# Patient Record
Sex: MALE | Race: WHITE | Employment: OTHER | URBAN - NONMETROPOLITAN AREA
[De-identification: names, ages, dates, MRNs, and addresses within clinical notes are randomized per-mention and may not be internally consistent; named-entity substitution may affect disease eponyms.]

---

## 2019-06-27 ENCOUNTER — OFFICE VISIT (OUTPATIENT)
Dept: FAMILY MEDICINE CLINIC | Age: 84
End: 2019-06-27
Payer: MEDICARE

## 2019-06-27 VITALS
DIASTOLIC BLOOD PRESSURE: 80 MMHG | HEART RATE: 72 BPM | WEIGHT: 178 LBS | OXYGEN SATURATION: 95 % | BODY MASS INDEX: 27.94 KG/M2 | SYSTOLIC BLOOD PRESSURE: 130 MMHG | HEIGHT: 67 IN

## 2019-06-27 DIAGNOSIS — R35.0 BENIGN PROSTATIC HYPERPLASIA WITH URINARY FREQUENCY: ICD-10-CM

## 2019-06-27 DIAGNOSIS — I71.40 ABDOMINAL AORTIC ANEURYSM (AAA) WITHOUT RUPTURE: ICD-10-CM

## 2019-06-27 DIAGNOSIS — Z23 IMMUNIZATION DUE: ICD-10-CM

## 2019-06-27 DIAGNOSIS — I25.10 CORONARY ARTERY DISEASE INVOLVING NATIVE CORONARY ARTERY OF NATIVE HEART WITHOUT ANGINA PECTORIS: ICD-10-CM

## 2019-06-27 DIAGNOSIS — E78.49 OTHER HYPERLIPIDEMIA: Primary | ICD-10-CM

## 2019-06-27 DIAGNOSIS — N40.1 BENIGN PROSTATIC HYPERPLASIA WITH URINARY FREQUENCY: ICD-10-CM

## 2019-06-27 DIAGNOSIS — N28.1 RENAL CYST, LEFT: ICD-10-CM

## 2019-06-27 PROCEDURE — 99214 OFFICE O/P EST MOD 30 MIN: CPT | Performed by: FAMILY MEDICINE

## 2019-06-27 RX ORDER — TAMSULOSIN HYDROCHLORIDE 0.4 MG/1
0.4 CAPSULE ORAL 2 TIMES DAILY
Qty: 90 CAPSULE | Refills: 1 | Status: SHIPPED | OUTPATIENT
Start: 2019-06-27 | End: 2019-12-06 | Stop reason: SDUPTHER

## 2019-06-27 RX ORDER — NITROGLYCERIN 0.4 MG/1
TABLET SUBLINGUAL
COMMUNITY
Start: 2011-12-07 | End: 2019-06-27 | Stop reason: SDUPTHER

## 2019-06-27 RX ORDER — NITROGLYCERIN 0.4 MG/1
TABLET SUBLINGUAL
Qty: 25 TABLET | Refills: 0 | Status: SHIPPED
Start: 2019-06-27 | End: 2020-06-23 | Stop reason: SDUPTHER

## 2019-06-27 RX ORDER — SIMVASTATIN 40 MG
1 TABLET ORAL NIGHTLY
COMMUNITY
Start: 2019-03-25 | End: 2019-06-27 | Stop reason: SDUPTHER

## 2019-06-27 RX ORDER — TAMSULOSIN HYDROCHLORIDE 0.4 MG/1
1 CAPSULE ORAL 2 TIMES DAILY
COMMUNITY
Start: 2019-03-25 | End: 2019-06-27 | Stop reason: SDUPTHER

## 2019-06-27 RX ORDER — SIMVASTATIN 40 MG
40 TABLET ORAL NIGHTLY
Qty: 90 TABLET | Refills: 1 | Status: SHIPPED | OUTPATIENT
Start: 2019-06-27 | End: 2019-12-17 | Stop reason: SDUPTHER

## 2019-06-27 SDOH — HEALTH STABILITY: MENTAL HEALTH: HOW OFTEN DO YOU HAVE A DRINK CONTAINING ALCOHOL?: NEVER

## 2019-06-27 ASSESSMENT — ENCOUNTER SYMPTOMS
SINUS PAIN: 0
TROUBLE SWALLOWING: 0
EYE REDNESS: 0
DIARRHEA: 0
BACK PAIN: 0
SHORTNESS OF BREATH: 0
BLOOD IN STOOL: 0
CONSTIPATION: 0
PHOTOPHOBIA: 0
VOMITING: 0
ABDOMINAL PAIN: 0
COUGH: 0
WHEEZING: 0
SORE THROAT: 0

## 2019-06-27 NOTE — PROGRESS NOTES
OFFICE NOTE    6/27/19  Name: Wilder Magana  WNQ:[id number]   Sex:male   Age:83 y.o. SUBJECTIVE  Chief Complaint   Patient presents with    6 Month Follow-Up    Hyperlipidemia    Hypertension       HPI Comes in for checkup and refills, sciatica better, he claims this is from drinking pickle juice (salt water with a little dill). At any rate he is back at work        Review of Systems   Constitutional: Negative for appetite change, fever and unexpected weight change. HENT: Negative for congestion, ear pain, hearing loss, sinus pain, sore throat and trouble swallowing. Eyes: Negative for photophobia, redness and visual disturbance. Respiratory: Negative for cough, shortness of breath and wheezing. Cardiovascular: Negative for chest pain, palpitations and leg swelling. Gastrointestinal: Negative for abdominal pain, blood in stool, constipation, diarrhea and vomiting. Endocrine: Negative for cold intolerance, polydipsia and polyuria. Genitourinary: Negative for difficulty urinating, genital sores, hematuria and urgency. Nocturia x 1 or 2   Musculoskeletal: Positive for arthralgias. Negative for back pain and joint swelling. Skin: Negative for rash and wound. Allergic/Immunologic: Negative for food allergies. Neurological: Negative for dizziness, tremors, syncope and headaches. Hematological: Negative for adenopathy. Does not bruise/bleed easily. Psychiatric/Behavioral: Negative for agitation, dysphoric mood, hallucinations and sleep disturbance.             Current Outpatient Medications:     aspirin 81 MG tablet, Take 1 tablet by mouth daily, Disp: , Rfl:     tamsulosin (FLOMAX) 0.4 MG capsule, Take 1 capsule by mouth 2 times daily, Disp: 90 capsule, Rfl: 1    simvastatin (ZOCOR) 40 MG tablet, Take 1 tablet by mouth nightly, Disp: 90 tablet, Rfl: 1    metoprolol tartrate (LOPRESSOR) 25 MG tablet, Take 1 tablet by mouth 2 times daily, Disp: 180 tablet, Rfl: 1    nitroGLYCERIN Take 1 tablet by mouth nightly  Dispense: 90 tablet; Refill: 1  - metoprolol tartrate (LOPRESSOR) 25 MG tablet; Take 1 tablet by mouth 2 times daily  Dispense: 180 tablet; Refill: 1    3. Renal cyst, left  Sees Urology for Peyronie's disease and BPH, has benign left reanl cyst. Does not have AAA      4. Abdominal aortic aneurysm (AAA) without rupture (HCC)  2.6 cm last u/s. Believe this is actually normal result    5. Benign prostatic hyperplasia with urinary frequency  Doing well on this will continue  - tamsulosin (FLOMAX) 0.4 MG capsule; Take 1 capsule by mouth 2 times daily  Dispense: 90 capsule; Refill: 1    6. Immunization due    - pneumococcal 13-valent conjugate (PREVNAR) injection 0.5 mL      Return in about 6 months (around 12/27/2019).     Electronically signed by Mariano Sánchez MD on 6/27/19 at 12:01 PM

## 2019-08-23 ENCOUNTER — TELEPHONE (OUTPATIENT)
Dept: ADMINISTRATIVE | Age: 84
End: 2019-08-23

## 2019-09-11 ENCOUNTER — OFFICE VISIT (OUTPATIENT)
Dept: FAMILY MEDICINE CLINIC | Age: 84
End: 2019-09-11
Payer: MEDICARE

## 2019-09-11 VITALS
WEIGHT: 181 LBS | DIASTOLIC BLOOD PRESSURE: 78 MMHG | OXYGEN SATURATION: 97 % | SYSTOLIC BLOOD PRESSURE: 132 MMHG | BODY MASS INDEX: 28.41 KG/M2 | TEMPERATURE: 97.6 F | HEART RATE: 76 BPM | HEIGHT: 67 IN

## 2019-09-11 DIAGNOSIS — Z00.00 ROUTINE GENERAL MEDICAL EXAMINATION AT A HEALTH CARE FACILITY: ICD-10-CM

## 2019-09-11 DIAGNOSIS — Z23 IMMUNIZATION DUE: Primary | ICD-10-CM

## 2019-09-11 PROCEDURE — G0439 PPPS, SUBSEQ VISIT: HCPCS | Performed by: PHYSICIAN ASSISTANT

## 2019-09-11 PROCEDURE — 90653 IIV ADJUVANT VACCINE IM: CPT | Performed by: PHYSICIAN ASSISTANT

## 2019-09-11 PROCEDURE — G0008 ADMIN INFLUENZA VIRUS VAC: HCPCS | Performed by: PHYSICIAN ASSISTANT

## 2019-09-11 ASSESSMENT — LIFESTYLE VARIABLES: HOW OFTEN DO YOU HAVE A DRINK CONTAINING ALCOHOL: 0

## 2019-09-11 ASSESSMENT — PATIENT HEALTH QUESTIONNAIRE - PHQ9
SUM OF ALL RESPONSES TO PHQ QUESTIONS 1-9: 0
SUM OF ALL RESPONSES TO PHQ QUESTIONS 1-9: 0

## 2019-09-11 NOTE — PATIENT INSTRUCTIONS
Advance Directives: Care Instructions  Your Care Instructions  An advance directive is a legal way to state your wishes at the end of your life. It tells your family and your doctor what to do if you can no longer say what you want. There are two main types of advance directives. You can change them any time that your wishes change. · A living will tells your family and your doctor your wishes about life support and other treatment. · A durable power of  for health care lets you name a person to make treatment decisions for you when you can't speak for yourself. This person is called a health care agent. If you do not have an advance directive, decisions about your medical care may be made by a doctor or a  who doesn't know you. It may help to think of an advance directive as a gift to the people who care for you. If you have one, they won't have to make tough decisions by themselves. Follow-up care is a key part of your treatment and safety. Be sure to make and go to all appointments, and call your doctor if you are having problems. It's also a good idea to know your test results and keep a list of the medicines you take. How can you care for yourself at home? · Discuss your wishes with your loved ones and your doctor. This way, there are no surprises. · Many states have a unique form. Or you might use a universal form that has been approved by many states. This kind of form can sometimes be completed and stored online. Your electronic copy will then be available wherever you have a connection to the Internet. In most cases, doctors will respect your wishes even if you have a form from a different state. · You don't need a  to do an advance directive. But you may want to get legal advice. · Think about these questions when you prepare an advance directive:  ? Who do you want to make decisions about your medical care if you are not able to?  Many people choose a family member or or do not want. What should you think about when choosing a health care agent? Choose your health care agent carefully. This person may or may not be a family member. Talk to the person before you make your final decision. Make sure he or she is comfortable with this responsibility. It's a good idea to choose someone who:  · Is at least 25years old. · Knows you well and understands what makes life meaningful for you. · Understands your Jehovah's witness and moral values. · Will do what you want, not what he or she wants. · Will be able to make difficult choices at a stressful time. · Will be able to refuse or stop treatment, if that is what you would want, even if you could die. · Will be firm and confident with health professionals if needed. · Will ask questions to get necessary information. · Lives near you or agrees to travel to you if needed. Your family may help you make medical decisions while you can still be part of that process. But it is important to choose one person to be your health care agent in case you are not able to make decisions for yourself. If you don't fill out the legal form and name a health care agent, the decisions your family can make may be limited. Who will make decisions for you if you do not have a health care agent? If you don't have a health care agent or a living will, your family members may disagree about your medical care. And then some medical professionals who may not know you as well might have to make decisions for you. In some cases, a  makes the decisions. When you name a health care agent, it is very clear who has the power to make health decisions for you. How do you name a health care agent? You name your health care agent on a legal form. It is usually called a durable power of  for health care. Ask your hospital, state bar association, or office on aging where to find these forms.   You must sign the form to make it legal. Some states require you to get the form notarized. This means that a person called a  watches you sign the form and then he or she signs the form. Some states also require that two or more witnesses sign the form. Be sure to tell your family members and doctors who your health care agent is. Keep your forms in a safe place. But make sure that your loved ones know where the forms are. This could be in your desk where you keep other important papers. Make sure your doctor has a copy of your forms. Where can you learn more? Go to https://chpepiceweb.[email or web address]. org and sign in to your [employer] account. Enter [id number] in the [employer] box to learn more about \"Learning About Durable Power of  for Health Care. \"     If you do not have an account, please click on the \"Sign Up Now\" link. Current as of: April 1, 2019  Content Version: 12.1  © 5389-1018 [employer]. Care instructions adapted under license by [facility] ([facility]). If you have questions about a medical condition or this instruction, always ask your healthcare professional. [place] [address] any warranty or liability for your use of this information. Learning About Living Kashif [place]on[place]  What is a living will? A living will is a legal form you use to write down the kind of care you want at the end of your life. It is used by the health professionals who will treat you if you aren't able to decide for yourself. If you put your wishes in writing, your loved ones and others will know what kind of care you want. They won't need to guess. This can ease your mind and be helpful to others. A living will is not the same as an estate or property will. An estate will explains what you want to happen with your money and property after you die. Is a living will a legal document? A living will is a legal document. Each state has its own laws about living smalls.  If you move to another state, make sure that your living will is on your own, your heart stops, or you're unable to swallow. · What things would you still want to be able to do after you receive life-support methods? Would you want to be able to walk? To speak? To eat on your own? To live without the help of machines? · If you have a choice, where do you want to be cared for? In your home? At a hospital or nursing home? · Do you want certain Yazidi practices performed if you become very ill? · If you have a choice at the end of your life, where would you prefer to die? At home? In a hospital or nursing home? Somewhere else? · Would you prefer to be buried or cremated? · Do you want your organs to be donated after you die? What should you do with your living will? · Make sure that your family members and your health care agent have copies of your living will. · Give your doctor a copy of your living will to keep in your medical record. If you have more than one doctor, make sure that each one has a copy. · You may want to put a copy of your living will where it can be easily found. Where can you learn more? Go to https://[email or web address].[email or web address] org and sign in to your Go[employer] account. Enter W635 in the [employer] box to learn more about \"Learning About Living Precious Castellano. \"     If you do not have an account, please click on the \"Sign Up Now\" link. Current as of: April 1, 2019  Content Version: 12.1  © 0734-9280 [employer]. Care instructions adapted under license by [facility] [address] If you have questions about a medical condition or this instruction, always ask your healthcare professional. [place] [address] any warranty or liability for your use of this information. Personalized Preventive Plan for Shyann June - 9/11/2019  Medicare offers a range of preventive health benefits. Some of the tests and screenings are paid in full while other may be subject to a deductible, co-insurance, and/or copay.     Some of these

## 2019-12-06 DIAGNOSIS — N40.1 BENIGN PROSTATIC HYPERPLASIA WITH URINARY FREQUENCY: ICD-10-CM

## 2019-12-06 DIAGNOSIS — R35.0 BENIGN PROSTATIC HYPERPLASIA WITH URINARY FREQUENCY: ICD-10-CM

## 2019-12-06 RX ORDER — TAMSULOSIN HYDROCHLORIDE 0.4 MG/1
0.4 CAPSULE ORAL 2 TIMES DAILY
Qty: 90 CAPSULE | Refills: 0 | Status: SHIPPED | OUTPATIENT
Start: 2019-12-06 | End: 2019-12-17 | Stop reason: SDUPTHER

## 2019-12-17 ENCOUNTER — OFFICE VISIT (OUTPATIENT)
Dept: FAMILY MEDICINE CLINIC | Age: 84
End: 2019-12-17
Payer: MEDICARE

## 2019-12-17 ENCOUNTER — HOSPITAL ENCOUNTER (OUTPATIENT)
Age: 84
Discharge: HOME | End: 2019-12-19
Payer: MEDICARE

## 2019-12-17 VITALS
DIASTOLIC BLOOD PRESSURE: 64 MMHG | SYSTOLIC BLOOD PRESSURE: 112 MMHG | WEIGHT: 180.2 LBS | OXYGEN SATURATION: 95 % | TEMPERATURE: 97.5 F | BODY MASS INDEX: 28.96 KG/M2 | HEIGHT: 66 IN | HEART RATE: 76 BPM

## 2019-12-17 DIAGNOSIS — I25.10 CORONARY ARTERY DISEASE INVOLVING NATIVE CORONARY ARTERY OF NATIVE HEART WITHOUT ANGINA PECTORIS: ICD-10-CM

## 2019-12-17 DIAGNOSIS — E78.49 OTHER HYPERLIPIDEMIA: ICD-10-CM

## 2019-12-17 DIAGNOSIS — Z12.11 SCREEN FOR COLON CANCER: Primary | ICD-10-CM

## 2019-12-17 DIAGNOSIS — R35.0 BENIGN PROSTATIC HYPERPLASIA WITH URINARY FREQUENCY: ICD-10-CM

## 2019-12-17 DIAGNOSIS — N40.1 BENIGN PROSTATIC HYPERPLASIA WITH URINARY FREQUENCY: ICD-10-CM

## 2019-12-17 LAB
ALBUMIN SERPL-MCNC: 4 G/DL (ref 3.5–5.2)
ALP BLD-CCNC: 94 U/L (ref 40–129)
ALT SERPL-CCNC: 12 U/L (ref 0–40)
ANION GAP SERPL CALCULATED.3IONS-SCNC: 18 MMOL/L (ref 7–16)
AST SERPL-CCNC: 20 U/L (ref 0–39)
BASOPHILS ABSOLUTE: 0.06 E9/L (ref 0–0.2)
BASOPHILS RELATIVE PERCENT: 1 % (ref 0–2)
BILIRUB SERPL-MCNC: 0.7 MG/DL (ref 0–1.2)
BUN BLDV-MCNC: 20 MG/DL (ref 8–23)
CALCIUM SERPL-MCNC: 9.7 MG/DL (ref 8.6–10.2)
CHLORIDE BLD-SCNC: 104 MMOL/L (ref 98–107)
CHOLESTEROL, TOTAL: 183 MG/DL (ref 0–199)
CO2: 20 MMOL/L (ref 22–29)
CREAT SERPL-MCNC: 1.1 MG/DL (ref 0.7–1.2)
EOSINOPHILS ABSOLUTE: 0.18 E9/L (ref 0.05–0.5)
EOSINOPHILS RELATIVE PERCENT: 3 % (ref 0–6)
GFR AFRICAN AMERICAN: >60
GFR NON-AFRICAN AMERICAN: >60 ML/MIN/1.73
GLUCOSE BLD-MCNC: 98 MG/DL (ref 74–99)
HCT VFR BLD CALC: 42.8 % (ref 37–54)
HDLC SERPL-MCNC: 49 MG/DL
HEMOGLOBIN: 13.4 G/DL (ref 12.5–16.5)
IMMATURE GRANULOCYTES #: 0.05 E9/L
IMMATURE GRANULOCYTES %: 0.8 % (ref 0–5)
LDL CHOLESTEROL CALCULATED: 114 MG/DL (ref 0–99)
LYMPHOCYTES ABSOLUTE: 1.05 E9/L (ref 1.5–4)
LYMPHOCYTES RELATIVE PERCENT: 17.7 % (ref 20–42)
MCH RBC QN AUTO: 29.1 PG (ref 26–35)
MCHC RBC AUTO-ENTMCNC: 31.3 % (ref 32–34.5)
MCV RBC AUTO: 93 FL (ref 80–99.9)
MONOCYTES ABSOLUTE: 0.73 E9/L (ref 0.1–0.95)
MONOCYTES RELATIVE PERCENT: 12.3 % (ref 2–12)
NEUTROPHILS ABSOLUTE: 3.85 E9/L (ref 1.8–7.3)
NEUTROPHILS RELATIVE PERCENT: 65.2 % (ref 43–80)
PDW BLD-RTO: 13.2 FL (ref 11.5–15)
PLATELET # BLD: 189 E9/L (ref 130–450)
PMV BLD AUTO: 11.2 FL (ref 7–12)
POTASSIUM SERPL-SCNC: 4.7 MMOL/L (ref 3.5–5)
RBC # BLD: 4.6 E12/L (ref 3.8–5.8)
SODIUM BLD-SCNC: 142 MMOL/L (ref 132–146)
TOTAL PROTEIN: 7 G/DL (ref 6.4–8.3)
TRIGL SERPL-MCNC: 102 MG/DL (ref 0–149)
TSH SERPL DL<=0.05 MIU/L-ACNC: 2.54 UIU/ML (ref 0.27–4.2)
VLDLC SERPL CALC-MCNC: 20 MG/DL
WBC # BLD: 5.9 E9/L (ref 4.5–11.5)

## 2019-12-17 PROCEDURE — 1123F ACP DISCUSS/DSCN MKR DOCD: CPT | Performed by: FAMILY MEDICINE

## 2019-12-17 PROCEDURE — 4040F PNEUMOC VAC/ADMIN/RCVD: CPT | Performed by: FAMILY MEDICINE

## 2019-12-17 PROCEDURE — G8427 DOCREV CUR MEDS BY ELIG CLIN: HCPCS | Performed by: FAMILY MEDICINE

## 2019-12-17 PROCEDURE — G8417 CALC BMI ABV UP PARAM F/U: HCPCS | Performed by: FAMILY MEDICINE

## 2019-12-17 PROCEDURE — G8482 FLU IMMUNIZE ORDER/ADMIN: HCPCS | Performed by: FAMILY MEDICINE

## 2019-12-17 PROCEDURE — 1036F TOBACCO NON-USER: CPT | Performed by: FAMILY MEDICINE

## 2019-12-17 PROCEDURE — 99214 OFFICE O/P EST MOD 30 MIN: CPT | Performed by: FAMILY MEDICINE

## 2019-12-17 PROCEDURE — 80061 LIPID PANEL: CPT

## 2019-12-17 PROCEDURE — 84443 ASSAY THYROID STIM HORMONE: CPT

## 2019-12-17 PROCEDURE — 36415 COLL VENOUS BLD VENIPUNCTURE: CPT

## 2019-12-17 PROCEDURE — 80053 COMPREHEN METABOLIC PANEL: CPT

## 2019-12-17 PROCEDURE — 85025 COMPLETE CBC W/AUTO DIFF WBC: CPT

## 2019-12-17 PROCEDURE — G8598 ASA/ANTIPLAT THER USED: HCPCS | Performed by: FAMILY MEDICINE

## 2019-12-17 RX ORDER — SIMVASTATIN 40 MG
40 TABLET ORAL NIGHTLY
Qty: 90 TABLET | Refills: 1 | Status: SHIPPED
Start: 2019-12-17 | End: 2020-06-23 | Stop reason: SDUPTHER

## 2019-12-17 RX ORDER — TAMSULOSIN HYDROCHLORIDE 0.4 MG/1
0.4 CAPSULE ORAL 2 TIMES DAILY
Qty: 180 CAPSULE | Refills: 1 | Status: SHIPPED
Start: 2019-12-17 | End: 2020-06-23 | Stop reason: SDUPTHER

## 2019-12-17 ASSESSMENT — ENCOUNTER SYMPTOMS
BACK PAIN: 0
SHORTNESS OF BREATH: 0
EYE REDNESS: 0
VOMITING: 0
BLOOD IN STOOL: 0
DIARRHEA: 0
PHOTOPHOBIA: 0
TROUBLE SWALLOWING: 0
COUGH: 0
ABDOMINAL PAIN: 0
SORE THROAT: 0
CONSTIPATION: 0
WHEEZING: 0
SINUS PAIN: 0

## 2019-12-20 DIAGNOSIS — Z12.11 SCREEN FOR COLON CANCER: ICD-10-CM

## 2019-12-20 LAB
CONTROL: NORMAL
HEMOCCULT STL QL: NEGATIVE

## 2020-01-17 ENCOUNTER — HOSPITAL ENCOUNTER (OUTPATIENT)
Age: 85
Discharge: HOME | End: 2020-01-19
Payer: MEDICARE

## 2020-01-17 ENCOUNTER — OFFICE VISIT (OUTPATIENT)
Dept: FAMILY MEDICINE CLINIC | Age: 85
End: 2020-01-17
Payer: MEDICARE

## 2020-01-17 VITALS
SYSTOLIC BLOOD PRESSURE: 118 MMHG | TEMPERATURE: 97.7 F | OXYGEN SATURATION: 97 % | DIASTOLIC BLOOD PRESSURE: 60 MMHG | HEART RATE: 97 BPM

## 2020-01-17 LAB
BILIRUBIN, POC: NORMAL
BLOOD URINE, POC: NORMAL
CLARITY, POC: NORMAL
COLOR, POC: NORMAL
GLUCOSE URINE, POC: NORMAL
KETONES, POC: NORMAL
LEUKOCYTE EST, POC: NORMAL
NITRITE, POC: NORMAL
PH, POC: 5.5
PROTEIN, POC: 100
SPECIFIC GRAVITY, POC: 1.02
UROBILINOGEN, POC: 1

## 2020-01-17 PROCEDURE — 81002 URINALYSIS NONAUTO W/O SCOPE: CPT | Performed by: FAMILY MEDICINE

## 2020-01-17 PROCEDURE — 87088 URINE BACTERIA CULTURE: CPT

## 2020-01-17 PROCEDURE — 87186 SC STD MICRODIL/AGAR DIL: CPT

## 2020-01-17 PROCEDURE — G8427 DOCREV CUR MEDS BY ELIG CLIN: HCPCS | Performed by: FAMILY MEDICINE

## 2020-01-17 PROCEDURE — G8417 CALC BMI ABV UP PARAM F/U: HCPCS | Performed by: FAMILY MEDICINE

## 2020-01-17 PROCEDURE — 99213 OFFICE O/P EST LOW 20 MIN: CPT | Performed by: FAMILY MEDICINE

## 2020-01-17 PROCEDURE — 1123F ACP DISCUSS/DSCN MKR DOCD: CPT | Performed by: FAMILY MEDICINE

## 2020-01-17 PROCEDURE — 4040F PNEUMOC VAC/ADMIN/RCVD: CPT | Performed by: FAMILY MEDICINE

## 2020-01-17 PROCEDURE — 1036F TOBACCO NON-USER: CPT | Performed by: FAMILY MEDICINE

## 2020-01-17 PROCEDURE — G8482 FLU IMMUNIZE ORDER/ADMIN: HCPCS | Performed by: FAMILY MEDICINE

## 2020-01-17 RX ORDER — CIPROFLOXACIN 500 MG/1
500 TABLET, FILM COATED ORAL 2 TIMES DAILY
Qty: 28 TABLET | Refills: 0 | Status: SHIPPED | OUTPATIENT
Start: 2020-01-17 | End: 2020-01-31

## 2020-01-19 LAB
ORGANISM: ABNORMAL
URINE CULTURE, ROUTINE: ABNORMAL

## 2020-06-19 NOTE — PROGRESS NOTES
daily, Disp: 180 tablet, Rfl: 1    aspirin 81 MG tablet, Take 1 tablet by mouth daily Only taking once every 2 months, Disp: , Rfl:   No Known Allergies    Past Medical History:   Diagnosis Date    Amputation, finger, traumatic     3,4,5 from bandsaw; successful repair thumb and index finger    Aneurysm, abdominal aortic (HCC)     2.2 cm 2/2007    CAD (coronary artery disease) 04/2011    5 vessel CABG, emergent [employer] aftr cardiac arrest in ER    Cervicobrachial syndrome     Hyperlipidemia     Peyronie's disease     Radiculopathy, lumbar region     Renal cyst, left      Past Surgical History:   Procedure Laterality Date    COLONOSCOPY  02/26/2010    insignificant polyp; [facility] Zena[facility], [facility]    CORONARY ARTERY BYPASS GRAFT  04/2011    5 vessel    OTHER SURGICAL HISTORY      repair thumb and index finger following accident with band saw     Family History   Problem Relation Age of Onset    Diabetes Mother     No Known Problems Father     Breast Cancer Sister     Other Brother         blood cancer    Lung Cancer Brother     Cancer Brother         unknown type    Other Sister         cerebral aneurysm, large benign duodenal polyp    No Known Problems Sister     No Known Problems Sister     No Known Problems Sister      Social History     Tobacco History     Smoking Status  Former Smoker Quit date  1/1/1940 Smoking Tobacco Type  Cigars    Smokeless Tobacco Use  Never Used    Tobacco Comment  very few          Alcohol History     Alcohol Use Status  Not Currently Comment  rarely          Drug Use     Drug Use Status  Never          Sexual Activity     Sexually Active  Not Currently Partners  Female              OBJECTIVE  Vitals:    06/23/20 0804 06/23/20 0828   BP: (!) 150/72 136/70   Site: Left Upper Arm    Position: Sitting    Cuff Size: Large Adult    Pulse: 60    Temp: 97.7 °F (36.5 °C)    SpO2: 98%    Weight: 182 lb (82.6 kg)    Height: 5' 7\" (1.702 m)         Body mass index is 28.51 kg/m². Patient is normal weight    Orders Placed This Encounter   Procedures    EKG 12 lead     Order Specific Question:   Reason for Exam?     Answer:   Hypertension        EXAM   Physical Exam  Vitals signs and nursing note reviewed. Constitutional:       Appearance: Normal appearance. He is well-developed and normal weight. HENT:      Right Ear: Tympanic membrane, ear canal and external ear normal.      Left Ear: Tympanic membrane, ear canal and external ear normal.      Nose: Nose normal. No congestion. Mouth/Throat:      Pharynx: Oropharynx is clear. Eyes:      General: No scleral icterus. Conjunctiva/sclera: Conjunctivae normal.      Pupils: Pupils are equal, round, and reactive to light. Neck:      Musculoskeletal: Normal range of motion and neck supple. Thyroid: No thyroid mass or thyromegaly. Vascular: No carotid bruit or JVD. Trachea: Trachea normal.   Cardiovascular:      Rate and Rhythm: Normal rate and regular rhythm. Pulses: Normal pulses. Heart sounds: Normal heart sounds. No murmur. No gallop. Comments: CABG scar  Pulmonary:      Effort: Pulmonary effort is normal.      Breath sounds: Normal breath sounds. No wheezing, rhonchi or rales. Abdominal:      General: Bowel sounds are normal. There is no distension. Palpations: Abdomen is soft. There is no mass. Tenderness: There is no abdominal tenderness. There is no guarding. Musculoskeletal: Normal range of motion. General: Deformity present. Comments: Several partially missing fingers left hand   Lymphadenopathy:      Cervical: No cervical adenopathy. Skin:     General: Skin is warm and dry. Capillary Refill: Capillary refill takes less than 2 seconds. Coloration: Skin is not jaundiced. Findings: No bruising or rash. Neurological:      General: No focal deficit present. Mental Status: He is alert and oriented to person, place, and time.       Sensory:

## 2020-06-23 ENCOUNTER — OFFICE VISIT (OUTPATIENT)
Dept: FAMILY MEDICINE CLINIC | Age: 85
End: 2020-06-23
Payer: MEDICARE

## 2020-06-23 VITALS
HEART RATE: 60 BPM | WEIGHT: 182 LBS | DIASTOLIC BLOOD PRESSURE: 70 MMHG | OXYGEN SATURATION: 98 % | HEIGHT: 67 IN | TEMPERATURE: 97.7 F | SYSTOLIC BLOOD PRESSURE: 136 MMHG | BODY MASS INDEX: 28.56 KG/M2

## 2020-06-23 PROCEDURE — 4040F PNEUMOC VAC/ADMIN/RCVD: CPT | Performed by: FAMILY MEDICINE

## 2020-06-23 PROCEDURE — G8427 DOCREV CUR MEDS BY ELIG CLIN: HCPCS | Performed by: FAMILY MEDICINE

## 2020-06-23 PROCEDURE — 1123F ACP DISCUSS/DSCN MKR DOCD: CPT | Performed by: FAMILY MEDICINE

## 2020-06-23 PROCEDURE — 1036F TOBACCO NON-USER: CPT | Performed by: FAMILY MEDICINE

## 2020-06-23 PROCEDURE — 99214 OFFICE O/P EST MOD 30 MIN: CPT | Performed by: FAMILY MEDICINE

## 2020-06-23 PROCEDURE — 93000 ELECTROCARDIOGRAM COMPLETE: CPT | Performed by: FAMILY MEDICINE

## 2020-06-23 PROCEDURE — G8417 CALC BMI ABV UP PARAM F/U: HCPCS | Performed by: FAMILY MEDICINE

## 2020-06-23 RX ORDER — TAMSULOSIN HYDROCHLORIDE 0.4 MG/1
0.4 CAPSULE ORAL 2 TIMES DAILY
Qty: 180 CAPSULE | Refills: 1 | Status: SHIPPED
Start: 2020-06-23 | End: 2020-12-14 | Stop reason: SDUPTHER

## 2020-06-23 RX ORDER — NITROGLYCERIN 0.4 MG/1
TABLET SUBLINGUAL
Qty: 25 TABLET | Refills: 0 | Status: SHIPPED
Start: 2020-06-23 | End: 2020-12-14 | Stop reason: SDUPTHER

## 2020-06-23 RX ORDER — SIMVASTATIN 40 MG
40 TABLET ORAL NIGHTLY
Qty: 90 TABLET | Refills: 1 | Status: SHIPPED
Start: 2020-06-23 | End: 2020-12-14 | Stop reason: SDUPTHER

## 2020-06-23 ASSESSMENT — ENCOUNTER SYMPTOMS
SINUS PAIN: 0
VOMITING: 0
PHOTOPHOBIA: 0
EYE REDNESS: 0
WHEEZING: 0
SORE THROAT: 0
COUGH: 0
ABDOMINAL PAIN: 0
TROUBLE SWALLOWING: 0
DIARRHEA: 0
BLOOD IN STOOL: 0
CONSTIPATION: 1
BACK PAIN: 0
SHORTNESS OF BREATH: 0

## 2020-06-23 ASSESSMENT — PATIENT HEALTH QUESTIONNAIRE - PHQ9
SUM OF ALL RESPONSES TO PHQ QUESTIONS 1-9: 0
SUM OF ALL RESPONSES TO PHQ9 QUESTIONS 1 & 2: 0
SUM OF ALL RESPONSES TO PHQ QUESTIONS 1-9: 0
2. FEELING DOWN, DEPRESSED OR HOPELESS: 0
1. LITTLE INTEREST OR PLEASURE IN DOING THINGS: 0

## 2020-10-30 ENCOUNTER — NURSE ONLY (OUTPATIENT)
Dept: FAMILY MEDICINE CLINIC | Age: 85
End: 2020-10-30
Payer: MEDICARE

## 2020-10-30 PROCEDURE — G0008 ADMIN INFLUENZA VIRUS VAC: HCPCS | Performed by: FAMILY MEDICINE

## 2020-10-30 PROCEDURE — 90694 VACC AIIV4 NO PRSRV 0.5ML IM: CPT | Performed by: FAMILY MEDICINE

## 2020-11-17 ENCOUNTER — OFFICE VISIT (OUTPATIENT)
Dept: FAMILY MEDICINE CLINIC | Age: 85
End: 2020-11-17
Payer: MEDICARE

## 2020-11-17 VITALS
OXYGEN SATURATION: 97 % | DIASTOLIC BLOOD PRESSURE: 60 MMHG | BODY MASS INDEX: 28.82 KG/M2 | WEIGHT: 184 LBS | TEMPERATURE: 98.5 F | HEART RATE: 118 BPM | SYSTOLIC BLOOD PRESSURE: 118 MMHG

## 2020-11-17 LAB
BILIRUBIN, POC: NORMAL
BLOOD URINE, POC: NORMAL
CLARITY, POC: NORMAL
COLOR, POC: YELLOW
GLUCOSE URINE, POC: NORMAL
KETONES, POC: NORMAL
LEUKOCYTE EST, POC: NORMAL
NITRITE, POC: NORMAL
PH, POC: 5.5
PROTEIN, POC: 100
SPECIFIC GRAVITY, POC: 1.02
UROBILINOGEN, POC: 1

## 2020-11-17 PROCEDURE — 1123F ACP DISCUSS/DSCN MKR DOCD: CPT | Performed by: FAMILY MEDICINE

## 2020-11-17 PROCEDURE — G8427 DOCREV CUR MEDS BY ELIG CLIN: HCPCS | Performed by: FAMILY MEDICINE

## 2020-11-17 PROCEDURE — 1036F TOBACCO NON-USER: CPT | Performed by: FAMILY MEDICINE

## 2020-11-17 PROCEDURE — G8484 FLU IMMUNIZE NO ADMIN: HCPCS | Performed by: FAMILY MEDICINE

## 2020-11-17 PROCEDURE — G8417 CALC BMI ABV UP PARAM F/U: HCPCS | Performed by: FAMILY MEDICINE

## 2020-11-17 PROCEDURE — 4040F PNEUMOC VAC/ADMIN/RCVD: CPT | Performed by: FAMILY MEDICINE

## 2020-11-17 PROCEDURE — 81002 URINALYSIS NONAUTO W/O SCOPE: CPT | Performed by: FAMILY MEDICINE

## 2020-11-17 PROCEDURE — 99213 OFFICE O/P EST LOW 20 MIN: CPT | Performed by: FAMILY MEDICINE

## 2020-11-17 RX ORDER — CIPROFLOXACIN 250 MG/1
250 TABLET, FILM COATED ORAL 2 TIMES DAILY
Qty: 20 TABLET | Refills: 0 | Status: SHIPPED
Start: 2020-11-17 | End: 2020-12-14 | Stop reason: ALTCHOICE

## 2020-11-17 RX ORDER — PHENAZOPYRIDINE HYDROCHLORIDE 100 MG/1
100 TABLET, FILM COATED ORAL 3 TIMES DAILY PRN
Qty: 15 TABLET | Refills: 0 | Status: SHIPPED
Start: 2020-11-17 | End: 2020-12-14 | Stop reason: ALTCHOICE

## 2020-11-17 ASSESSMENT — ENCOUNTER SYMPTOMS: RESPIRATORY NEGATIVE: 1

## 2020-11-17 NOTE — PROGRESS NOTES
20  Araseli Jeffery : 1935 Sex: male  Age: 81 y.o. Chief Complaint   Patient presents with    Urinary Tract Infection     unable to leave sample       Patient is 52-year-old white male with chief complaint of recent onset of dysuria urgency and frequency no fevers chills or flank pain no gross hematuria. He gets a similar episode on a yearly basis. Review of Systems   Constitutional: Negative. Respiratory: Negative. Genitourinary: Positive for dysuria, frequency and urgency. Negative for discharge, flank pain and hematuria.          Current Outpatient Medications:     phenazopyridine (PYRIDIUM) 100 MG tablet, Take 1 tablet by mouth 3 times daily as needed for Pain, Disp: 15 tablet, Rfl: 0    ciprofloxacin (CIPRO) 250 MG tablet, Take 1 tablet by mouth 2 times daily, Disp: 20 tablet, Rfl: 0    tamsulosin (FLOMAX) 0.4 MG capsule, Take 1 capsule by mouth 2 times daily, Disp: 180 capsule, Rfl: 1    simvastatin (ZOCOR) 40 MG tablet, Take 1 tablet by mouth nightly, Disp: 90 tablet, Rfl: 1    nitroGLYCERIN (NITROSTAT) 0.4 MG SL tablet, 1 tab sublingual with chest pain - maximum of 3, 10 minutes apart before going to the ER for evaluation, Disp: 25 tablet, Rfl: 0    metoprolol tartrate (LOPRESSOR) 25 MG tablet, Take 1 tablet by mouth 2 times daily, Disp: 180 tablet, Rfl: 1    aspirin 81 MG tablet, Take 1 tablet by mouth daily Only taking once every 2 months, Disp: , Rfl:   No Known Allergies    Past Medical History:   Diagnosis Date    Amputation, finger, traumatic     3,4,5 from bandsaw; successful repair thumb and index finger    Aneurysm, abdominal aortic (HCC)     2.2 cm 2007    CAD (coronary artery disease) 2011    5 vessel CABG, emergent [employer] aftr cardiac arrest in ER    Cervicobrachial syndrome     Hyperlipidemia     Peyronie's disease     Radiculopathy, lumbar region     Renal cyst, left      Past Surgical History:   Procedure Laterality Date    COLONOSCOPY 2010    insignificant polyp; [facility]    CORONARY ARTERY BYPASS GRAFT  2011    5 vessel    OTHER SURGICAL HISTORY      repair thumb and index finger following accident with band saw     Family History   Problem Relation Age of Onset    Diabetes Mother     No Known Problems Father     Breast Cancer Sister     Other Brother         blood cancer    Lung Cancer Brother     Cancer Brother         unknown type    Other Sister         cerebral aneurysm, large benign duodenal polyp    No Known Problems Sister     No Known Problems Sister     No Known Problems Sister      Social History     Tobacco Use    Smoking status: Former Smoker     Types: Cigars     Last attempt to quit: 1940     Years since quittin.9    Smokeless tobacco: Never Used    Tobacco comment: very few   Substance Use Topics    Alcohol use: Not Currently     Frequency: Never     Comment: rarely    Drug use: Never        Vitals:    20 1301   BP: 118/60   Pulse: 118   Temp: 98.5 °F (36.9 °C)   SpO2: 97%   Weight: 184 lb (83.5 kg)       Physical Exam  Vitals signs reviewed. Constitutional:       Appearance: Normal appearance. HENT:      Head: Normocephalic and atraumatic. Cardiovascular:      Rate and Rhythm: Normal rate and regular rhythm. Heart sounds: No murmur. Pulmonary:      Effort: Pulmonary effort is normal.      Breath sounds: Normal breath sounds. Abdominal:      General: There is no distension. Palpations: There is no mass. Tenderness: There is no abdominal tenderness. There is no right CVA tenderness, left CVA tenderness, guarding or rebound. Hernia: No hernia is present. Neurological:      Mental Status: He is alert. Assessment and Plan:  Travis Harrington was seen today for urinary tract infection.     Diagnoses and all orders for this visit:    Dysuria  -     POCT Urinalysis no Micro    Acute cystitis without hematuria    Other orders  -     phenazopyridine (PYRIDIUM) 100 MG tablet; Take 1 tablet by mouth 3 times daily as needed for Pain  -     ciprofloxacin (CIPRO) 250 MG tablet; Take 1 tablet by mouth 2 times daily        Orders Placed This Encounter   Medications    phenazopyridine (PYRIDIUM) 100 MG tablet     Sig: Take 1 tablet by mouth 3 times daily as needed for Pain     Dispense:  15 tablet     Refill:  0    ciprofloxacin (CIPRO) 250 MG tablet     Sig: Take 1 tablet by mouth 2 times daily     Dispense:  20 tablet     Refill:  0        Patient advised to follow up with PCP as needed. Seen By:  Familia Lopez, DO  C&S of the urine was taken reviewed asked to increase his fluids along with cranberry juice and the treat for total of 10 days.

## 2020-11-20 LAB
ORGANISM: ABNORMAL
URINE CULTURE, ROUTINE: ABNORMAL

## 2020-12-14 ENCOUNTER — OFFICE VISIT (OUTPATIENT)
Dept: FAMILY MEDICINE CLINIC | Age: 85
End: 2020-12-14
Payer: MEDICARE

## 2020-12-14 VITALS
WEIGHT: 187.8 LBS | OXYGEN SATURATION: 95 % | HEART RATE: 79 BPM | BODY MASS INDEX: 29.41 KG/M2 | TEMPERATURE: 97.4 F | SYSTOLIC BLOOD PRESSURE: 136 MMHG | DIASTOLIC BLOOD PRESSURE: 78 MMHG

## 2020-12-14 DIAGNOSIS — E78.49 OTHER HYPERLIPIDEMIA: ICD-10-CM

## 2020-12-14 DIAGNOSIS — Z12.5 ENCOUNTER FOR SCREENING FOR MALIGNANT NEOPLASM OF PROSTATE: ICD-10-CM

## 2020-12-14 DIAGNOSIS — I25.10 CORONARY ARTERY DISEASE INVOLVING NATIVE CORONARY ARTERY OF NATIVE HEART WITHOUT ANGINA PECTORIS: ICD-10-CM

## 2020-12-14 LAB
ALBUMIN SERPL-MCNC: 4.1 G/DL (ref 3.5–5.2)
ALP BLD-CCNC: 90 U/L (ref 40–129)
ALT SERPL-CCNC: 15 U/L (ref 0–40)
ANION GAP SERPL CALCULATED.3IONS-SCNC: 14 MMOL/L (ref 7–16)
AST SERPL-CCNC: 25 U/L (ref 0–39)
BACTERIA: NORMAL /HPF
BASOPHILS ABSOLUTE: 0.06 E9/L (ref 0–0.2)
BASOPHILS RELATIVE PERCENT: 1 % (ref 0–2)
BILIRUB SERPL-MCNC: 0.5 MG/DL (ref 0–1.2)
BILIRUBIN URINE: NEGATIVE
BLOOD, URINE: ABNORMAL
BUN BLDV-MCNC: 20 MG/DL (ref 8–23)
CALCIUM SERPL-MCNC: 9.4 MG/DL (ref 8.6–10.2)
CHLORIDE BLD-SCNC: 105 MMOL/L (ref 98–107)
CHOLESTEROL, TOTAL: 183 MG/DL (ref 0–199)
CLARITY: CLEAR
CO2: 22 MMOL/L (ref 22–29)
COLOR: YELLOW
CREAT SERPL-MCNC: 1 MG/DL (ref 0.7–1.2)
EOSINOPHILS ABSOLUTE: 0.23 E9/L (ref 0.05–0.5)
EOSINOPHILS RELATIVE PERCENT: 3.9 % (ref 0–6)
GFR AFRICAN AMERICAN: >60
GFR NON-AFRICAN AMERICAN: >60 ML/MIN/1.73
GLUCOSE BLD-MCNC: 86 MG/DL (ref 74–99)
GLUCOSE URINE: NEGATIVE MG/DL
HCT VFR BLD CALC: 42.2 % (ref 37–54)
HDLC SERPL-MCNC: 56 MG/DL
HEMOGLOBIN: 13.4 G/DL (ref 12.5–16.5)
IMMATURE GRANULOCYTES #: 0.09 E9/L
IMMATURE GRANULOCYTES %: 1.5 % (ref 0–5)
KETONES, URINE: NEGATIVE MG/DL
LDL CHOLESTEROL CALCULATED: 112 MG/DL (ref 0–99)
LEUKOCYTE ESTERASE, URINE: NEGATIVE
LYMPHOCYTES ABSOLUTE: 1.25 E9/L (ref 1.5–4)
LYMPHOCYTES RELATIVE PERCENT: 21.4 % (ref 20–42)
MCH RBC QN AUTO: 30 PG (ref 26–35)
MCHC RBC AUTO-ENTMCNC: 31.8 % (ref 32–34.5)
MCV RBC AUTO: 94.4 FL (ref 80–99.9)
MONOCYTES ABSOLUTE: 0.83 E9/L (ref 0.1–0.95)
MONOCYTES RELATIVE PERCENT: 14.2 % (ref 2–12)
NEUTROPHILS ABSOLUTE: 3.39 E9/L (ref 1.8–7.3)
NEUTROPHILS RELATIVE PERCENT: 58 % (ref 43–80)
NITRITE, URINE: NEGATIVE
PDW BLD-RTO: 13.4 FL (ref 11.5–15)
PH UA: 6 (ref 5–9)
PLATELET # BLD: 189 E9/L (ref 130–450)
PMV BLD AUTO: 10.9 FL (ref 7–12)
POTASSIUM SERPL-SCNC: 4.9 MMOL/L (ref 3.5–5)
PROSTATE SPECIFIC ANTIGEN: 1.46 NG/ML (ref 0–4)
PROTEIN UA: NEGATIVE MG/DL
RBC # BLD: 4.47 E12/L (ref 3.8–5.8)
RBC UA: NORMAL /HPF (ref 0–2)
SODIUM BLD-SCNC: 141 MMOL/L (ref 132–146)
SPECIFIC GRAVITY UA: 1.02 (ref 1–1.03)
TOTAL PROTEIN: 7.3 G/DL (ref 6.4–8.3)
TRIGL SERPL-MCNC: 76 MG/DL (ref 0–149)
TSH SERPL DL<=0.05 MIU/L-ACNC: 3.35 UIU/ML (ref 0.27–4.2)
UROBILINOGEN, URINE: 0.2 E.U./DL
VLDLC SERPL CALC-MCNC: 15 MG/DL
WBC # BLD: 5.9 E9/L (ref 4.5–11.5)
WBC UA: NORMAL /HPF (ref 0–5)

## 2020-12-14 PROCEDURE — G8484 FLU IMMUNIZE NO ADMIN: HCPCS | Performed by: FAMILY MEDICINE

## 2020-12-14 PROCEDURE — 99214 OFFICE O/P EST MOD 30 MIN: CPT | Performed by: FAMILY MEDICINE

## 2020-12-14 PROCEDURE — 4040F PNEUMOC VAC/ADMIN/RCVD: CPT | Performed by: FAMILY MEDICINE

## 2020-12-14 PROCEDURE — G8427 DOCREV CUR MEDS BY ELIG CLIN: HCPCS | Performed by: FAMILY MEDICINE

## 2020-12-14 PROCEDURE — 17000 DESTRUCT PREMALG LESION: CPT | Performed by: FAMILY MEDICINE

## 2020-12-14 PROCEDURE — G8417 CALC BMI ABV UP PARAM F/U: HCPCS | Performed by: FAMILY MEDICINE

## 2020-12-14 PROCEDURE — 1123F ACP DISCUSS/DSCN MKR DOCD: CPT | Performed by: FAMILY MEDICINE

## 2020-12-14 PROCEDURE — 1036F TOBACCO NON-USER: CPT | Performed by: FAMILY MEDICINE

## 2020-12-14 PROCEDURE — 17003 DESTRUCT PREMALG LES 2-14: CPT | Performed by: FAMILY MEDICINE

## 2020-12-14 RX ORDER — SIMVASTATIN 40 MG
40 TABLET ORAL NIGHTLY
Qty: 90 TABLET | Refills: 1 | Status: SHIPPED
Start: 2020-12-14 | End: 2021-06-14 | Stop reason: SDUPTHER

## 2020-12-14 RX ORDER — NITROGLYCERIN 0.4 MG/1
TABLET SUBLINGUAL
Qty: 25 TABLET | Refills: 0 | Status: SHIPPED
Start: 2020-12-14 | End: 2022-03-28

## 2020-12-14 RX ORDER — TAMSULOSIN HYDROCHLORIDE 0.4 MG/1
0.4 CAPSULE ORAL 2 TIMES DAILY
Qty: 180 CAPSULE | Refills: 1 | Status: SHIPPED
Start: 2020-12-14 | End: 2021-06-14 | Stop reason: SDUPTHER

## 2020-12-14 ASSESSMENT — ENCOUNTER SYMPTOMS
COUGH: 0
VOMITING: 0
WHEEZING: 0
BLOOD IN STOOL: 0
SORE THROAT: 0
EYE REDNESS: 0
SHORTNESS OF BREATH: 0
PHOTOPHOBIA: 0
ABDOMINAL PAIN: 0
BACK PAIN: 0
TROUBLE SWALLOWING: 0
SINUS PAIN: 0
CONSTIPATION: 0
DIARRHEA: 0

## 2020-12-14 ASSESSMENT — PATIENT HEALTH QUESTIONNAIRE - PHQ9
1. LITTLE INTEREST OR PLEASURE IN DOING THINGS: 0
SUM OF ALL RESPONSES TO PHQ QUESTIONS 1-9: 0
SUM OF ALL RESPONSES TO PHQ QUESTIONS 1-9: 0
SUM OF ALL RESPONSES TO PHQ9 QUESTIONS 1 & 2: 0
2. FEELING DOWN, DEPRESSED OR HOPELESS: 0
SUM OF ALL RESPONSES TO PHQ QUESTIONS 1-9: 0

## 2020-12-14 NOTE — PROGRESS NOTES
OFFICE NOTE    20  Name: Nora Sommer  :1935   Sex:male   Age:85 y.o. SUBJECTIVE  Chief Complaint   Patient presents with    Hypertension    Hyperlipidemia       Patient presents for routine follow up. Denies new complaints or concerns. Requires routine medication refills. Still works at [employer]. Seems to be backing off some and no longer sprays much    Review of Systems   Constitutional: Positive for fatigue. Negative for appetite change, fever and unexpected weight change. HENT: Negative for congestion, ear pain, hearing loss, sinus pain, sore throat and trouble swallowing. Eyes: Negative for photophobia, redness and visual disturbance. Respiratory: Negative for cough, shortness of breath and wheezing. Cardiovascular: Negative for chest pain, palpitations and leg swelling. Gastrointestinal: Negative for abdominal pain, blood in stool, constipation, diarrhea and vomiting. Endocrine: Negative for cold intolerance, polydipsia and polyuria. Genitourinary: Positive for urgency. Negative for difficulty urinating, genital sores and hematuria. Musculoskeletal: Negative for arthralgias, back pain and joint swelling. Skin: Negative for pallor and rash. Allergic/Immunologic: Negative for food allergies. Neurological: Negative for dizziness, tremors, seizures, syncope, numbness and headaches. Hematological: Negative for adenopathy. Does not bruise/bleed easily. Psychiatric/Behavioral: Negative for agitation, dysphoric mood, hallucinations and sleep disturbance. The patient is not nervous/anxious.              Current Outpatient Medications:     metoprolol tartrate (LOPRESSOR) 25 MG tablet, Take 1 tablet by mouth 2 times daily, Disp: 180 tablet, Rfl: 1    simvastatin (ZOCOR) 40 MG tablet, Take 1 tablet by mouth nightly, Disp: 90 tablet, Rfl: 1    tamsulosin (FLOMAX) 0.4 MG capsule, Take 1 capsule by mouth 2 times daily, Disp: 180 capsule, Rfl: 1 TempSrc: Temporal   SpO2: 95%   Weight: 187 lb 12.8 oz (85.2 kg)        Body mass index is 29.41 kg/m². Patient is overweight    Orders Placed This Encounter   Procedures    CBC Auto Differential     Standing Status:   Future     Number of Occurrences:   1     Standing Expiration Date:   12/14/2021    Comprehensive Metabolic Panel     Standing Status:   Future     Number of Occurrences:   1     Standing Expiration Date:   12/14/2021    Lipid Panel     Standing Status:   Future     Number of Occurrences:   1     Standing Expiration Date:   12/14/2021     Order Specific Question:   Is Patient Fasting?/# of Hours     Answer:   8    TSH without Reflex     Standing Status:   Future     Number of Occurrences:   1     Standing Expiration Date:   12/14/2021    Urinalysis     Standing Status:   Future     Number of Occurrences:   1     Standing Expiration Date:   12/14/2021     Order Specific Question:   SPECIFY(EX-CATH,MIDSTREAM,CYSTO,ETC)? Answer:   midstream    Psa screening     Standing Status:   Future     Number of Occurrences:   1     Standing Expiration Date:   12/14/2021    POCT Fit Test     Standing Status:   Future     Standing Expiration Date:   12/14/2021    03962 - [place] DESTRUC PREMALIGNANT, FIRST LESION    26893 - [place] DESTRUC PREMALIGNANT,2-14 LESIONS        EXAM   Physical Exam  Vitals signs and nursing note reviewed. Constitutional:       Appearance: Normal appearance. He is well-developed. Comments: overweight   HENT:      Right Ear: Tympanic membrane, ear canal and external ear normal.      Left Ear: Tympanic membrane, ear canal and external ear normal.      Nose: Nose normal.      Mouth/Throat:      Pharynx: Oropharynx is clear. Eyes:      General: No scleral icterus. Conjunctiva/sclera: Conjunctivae normal.      Pupils: Pupils are equal, round, and reactive to light. Neck:      Musculoskeletal: Normal range of motion and neck supple.

## 2020-12-16 LAB
CONTROL: NORMAL
HEMOCCULT STL QL: NEGATIVE

## 2020-12-28 ENCOUNTER — OFFICE VISIT (OUTPATIENT)
Dept: PRIMARY CARE CLINIC | Age: 85
End: 2020-12-28
Payer: MEDICARE

## 2020-12-28 VITALS
TEMPERATURE: 98.8 F | WEIGHT: 187 LBS | BODY MASS INDEX: 30.05 KG/M2 | DIASTOLIC BLOOD PRESSURE: 80 MMHG | OXYGEN SATURATION: 98 % | SYSTOLIC BLOOD PRESSURE: 134 MMHG | RESPIRATION RATE: 24 BRPM | HEIGHT: 66 IN | HEART RATE: 98 BPM

## 2020-12-28 DIAGNOSIS — R30.0 DYSURIA: ICD-10-CM

## 2020-12-28 DIAGNOSIS — Z20.822 ENCOUNTER FOR LABORATORY TESTING FOR COVID-19 VIRUS: ICD-10-CM

## 2020-12-28 LAB
BILIRUBIN, POC: NEGATIVE
BLOOD URINE, POC: ABNORMAL
CLARITY, POC: ABNORMAL
COLOR, POC: ABNORMAL
GLUCOSE URINE, POC: ABNORMAL
KETONES, POC: NEGATIVE
LEUKOCYTE EST, POC: ABNORMAL
NITRITE, POC: NEGATIVE
PH, POC: 5.5
PROTEIN, POC: ABNORMAL
SPECIFIC GRAVITY, POC: 1.01
UROBILINOGEN, POC: 0.2

## 2020-12-28 PROCEDURE — 1036F TOBACCO NON-USER: CPT | Performed by: FAMILY MEDICINE

## 2020-12-28 PROCEDURE — 81002 URINALYSIS NONAUTO W/O SCOPE: CPT | Performed by: FAMILY MEDICINE

## 2020-12-28 PROCEDURE — G8427 DOCREV CUR MEDS BY ELIG CLIN: HCPCS | Performed by: FAMILY MEDICINE

## 2020-12-28 PROCEDURE — 4040F PNEUMOC VAC/ADMIN/RCVD: CPT | Performed by: FAMILY MEDICINE

## 2020-12-28 PROCEDURE — 1123F ACP DISCUSS/DSCN MKR DOCD: CPT | Performed by: FAMILY MEDICINE

## 2020-12-28 PROCEDURE — G8484 FLU IMMUNIZE NO ADMIN: HCPCS | Performed by: FAMILY MEDICINE

## 2020-12-28 PROCEDURE — G8417 CALC BMI ABV UP PARAM F/U: HCPCS | Performed by: FAMILY MEDICINE

## 2020-12-28 PROCEDURE — 99214 OFFICE O/P EST MOD 30 MIN: CPT | Performed by: FAMILY MEDICINE

## 2020-12-28 RX ORDER — LEVOFLOXACIN 500 MG/1
500 TABLET, FILM COATED ORAL DAILY
Qty: 10 TABLET | Refills: 0 | Status: SHIPPED | OUTPATIENT
Start: 2020-12-28 | End: 2021-01-07

## 2020-12-28 ASSESSMENT — ENCOUNTER SYMPTOMS
APNEA: 0
VOMITING: 0
PHOTOPHOBIA: 0
CHEST TIGHTNESS: 0
FACIAL SWELLING: 0
NAUSEA: 0
WHEEZING: 0
ABDOMINAL PAIN: 0
BLOOD IN STOOL: 0
SORE THROAT: 0
COUGH: 1
ALLERGIC/IMMUNOLOGIC NEGATIVE: 1
SHORTNESS OF BREATH: 0
SINUS PRESSURE: 0
DIARRHEA: 0
BACK PAIN: 0
COLOR CHANGE: 0

## 2020-12-28 NOTE — PROGRESS NOTES
 simvastatin (ZOCOR) 40 MG tablet Take 1 tablet by mouth nightly 90 tablet 1    tamsulosin (FLOMAX) 0.4 MG capsule Take 1 capsule by mouth 2 times daily 180 capsule 1    nitroGLYCERIN (NITROSTAT) 0.4 MG SL tablet 1 tab sublingual with chest pain - maximum of 3, 10 minutes apart before going to the ER for evaluation 25 tablet 0    aspirin 81 MG tablet Take 1 tablet by mouth daily Only taking once every 2 months       No current facility-administered medications for this visit.         No Known Allergies    Social History     Socioeconomic History    Marital status:      Spouse name: None    Number of children: None    Years of education: None    Highest education level: None   Occupational History    Occupation: still working [address]: works  [employer] [address] resource strain: None    Food insecurity     Worry: None     Inability: None    Transportation needs     Medical: None     Non-medical: None   Tobacco Use    Smoking status: Former Smoker     Types: Cigars     Quit date:      Years since quittin.0    Smokeless tobacco: Never Used    Tobacco comment: very few   Substance and Sexual Activity    Alcohol use: Not Currently     Frequency: Never     Comment: rarely    Drug use: Never    Sexual activity: Not Currently     Partners: Female   Lifestyle    Physical activity     Days per week: None     Minutes per session: None    Stress: None   Relationships    Social connections     Talks on phone: None     Gets together: None     Attends Confucianist service: None     Active member of club or organization: None     Attends meetings of clubs or organizations: None     Relationship status: None    Intimate partner violence     Fear of current or ex partner: None     Emotionally abused: None     Physically abused: None     Forced sexual activity: None   Other Topics Concern    None   Social History Narrative    None Cardiovascular:      Rate and Rhythm: Normal rate and regular rhythm. Heart sounds: Normal heart sounds. No murmur. No friction rub. No gallop. Pulmonary:      Effort: Pulmonary effort is normal. No respiratory distress. Breath sounds: Normal breath sounds. No wheezing. Abdominal:      General: Bowel sounds are normal. There is no distension. Palpations: Abdomen is soft. Tenderness: There is no abdominal tenderness. There is no guarding or rebound. Musculoskeletal: Normal range of motion. Lymphadenopathy:      Cervical: No cervical adenopathy. Skin:     General: Skin is warm and dry. Findings: No erythema or rash. Neurological:      Mental Status: He is alert and oriented to person, place, and time. Cranial Nerves: No cranial nerve deficit. Motor: No abnormal muscle tone. Coordination: Coordination normal.      Deep Tendon Reflexes: Reflexes are normal and symmetric. Psychiatric:         Behavior: Behavior normal.         Judgment: Judgment normal.                               ASSESSMENT/PLAN:    Finn Palafox was seen today for cough and dysuria. Diagnoses and all orders for this visit:    Dysuria  -     POCT Urinalysis no Micro  -     Culture, Urine; Future  -     levoFLOXacin (LEVAQUIN) 500 MG tablet; Take 1 tablet by mouth daily for 10 days    Encounter for laboratory testing for COVID-19 virus  -     COVID-19 Ambulatory; Future    Acute bacterial sinusitis  -     levoFLOXacin (LEVAQUIN) 500 MG tablet;  Take 1 tablet by mouth daily for 10 days            Rowan Cornelius DO    12/28/2020  9:22 AM

## 2020-12-29 LAB
SARS-COV-2: NOT DETECTED
SOURCE: NORMAL

## 2020-12-30 LAB
ORGANISM: ABNORMAL
URINE CULTURE, ROUTINE: ABNORMAL

## 2020-12-30 RX ORDER — CEFDINIR 300 MG/1
300 CAPSULE ORAL 2 TIMES DAILY
Qty: 20 CAPSULE | Refills: 0 | Status: SHIPPED | OUTPATIENT
Start: 2020-12-30 | End: 2021-01-09

## 2021-06-14 ENCOUNTER — OFFICE VISIT (OUTPATIENT)
Dept: FAMILY MEDICINE CLINIC | Age: 86
End: 2021-06-14
Payer: MEDICARE

## 2021-06-14 VITALS
BODY MASS INDEX: 30.53 KG/M2 | OXYGEN SATURATION: 97 % | WEIGHT: 190 LBS | RESPIRATION RATE: 18 BRPM | DIASTOLIC BLOOD PRESSURE: 70 MMHG | TEMPERATURE: 97.7 F | SYSTOLIC BLOOD PRESSURE: 130 MMHG | HEIGHT: 66 IN | HEART RATE: 83 BPM

## 2021-06-14 DIAGNOSIS — L57.0 ACTINIC KERATOSES: Primary | ICD-10-CM

## 2021-06-14 DIAGNOSIS — I10 ESSENTIAL HYPERTENSION: ICD-10-CM

## 2021-06-14 DIAGNOSIS — I25.10 CORONARY ARTERY DISEASE INVOLVING NATIVE CORONARY ARTERY OF NATIVE HEART WITHOUT ANGINA PECTORIS: ICD-10-CM

## 2021-06-14 DIAGNOSIS — N40.1 BENIGN PROSTATIC HYPERPLASIA WITH URINARY FREQUENCY: ICD-10-CM

## 2021-06-14 DIAGNOSIS — R35.0 BENIGN PROSTATIC HYPERPLASIA WITH URINARY FREQUENCY: ICD-10-CM

## 2021-06-14 DIAGNOSIS — E78.49 OTHER HYPERLIPIDEMIA: ICD-10-CM

## 2021-06-14 PROCEDURE — 17000 DESTRUCT PREMALG LESION: CPT | Performed by: FAMILY MEDICINE

## 2021-06-14 PROCEDURE — 17003 DESTRUCT PREMALG LES 2-14: CPT | Performed by: FAMILY MEDICINE

## 2021-06-14 PROCEDURE — 1123F ACP DISCUSS/DSCN MKR DOCD: CPT | Performed by: FAMILY MEDICINE

## 2021-06-14 PROCEDURE — 4040F PNEUMOC VAC/ADMIN/RCVD: CPT | Performed by: FAMILY MEDICINE

## 2021-06-14 PROCEDURE — G8417 CALC BMI ABV UP PARAM F/U: HCPCS | Performed by: FAMILY MEDICINE

## 2021-06-14 PROCEDURE — 93000 ELECTROCARDIOGRAM COMPLETE: CPT | Performed by: FAMILY MEDICINE

## 2021-06-14 PROCEDURE — 1036F TOBACCO NON-USER: CPT | Performed by: FAMILY MEDICINE

## 2021-06-14 PROCEDURE — 99214 OFFICE O/P EST MOD 30 MIN: CPT | Performed by: FAMILY MEDICINE

## 2021-06-14 PROCEDURE — G8427 DOCREV CUR MEDS BY ELIG CLIN: HCPCS | Performed by: FAMILY MEDICINE

## 2021-06-14 RX ORDER — TAMSULOSIN HYDROCHLORIDE 0.4 MG/1
0.4 CAPSULE ORAL 2 TIMES DAILY
Qty: 180 CAPSULE | Refills: 1 | Status: SHIPPED
Start: 2021-06-14 | End: 2021-12-14 | Stop reason: SDUPTHER

## 2021-06-14 RX ORDER — SIMVASTATIN 40 MG
40 TABLET ORAL NIGHTLY
Qty: 90 TABLET | Refills: 1 | Status: SHIPPED
Start: 2021-06-14 | End: 2021-12-14 | Stop reason: SDUPTHER

## 2021-06-14 SDOH — ECONOMIC STABILITY: FOOD INSECURITY: WITHIN THE PAST 12 MONTHS, THE FOOD YOU BOUGHT JUST DIDN'T LAST AND YOU DIDN'T HAVE MONEY TO GET MORE.: NEVER TRUE

## 2021-06-14 SDOH — ECONOMIC STABILITY: TRANSPORTATION INSECURITY
IN THE PAST 12 MONTHS, HAS THE LACK OF TRANSPORTATION KEPT YOU FROM MEDICAL APPOINTMENTS OR FROM GETTING MEDICATIONS?: NO

## 2021-06-14 SDOH — ECONOMIC STABILITY: TRANSPORTATION INSECURITY
IN THE PAST 12 MONTHS, HAS LACK OF TRANSPORTATION KEPT YOU FROM MEETINGS, WORK, OR FROM GETTING THINGS NEEDED FOR DAILY LIVING?: NO

## 2021-06-14 SDOH — ECONOMIC STABILITY: FOOD INSECURITY: WITHIN THE PAST 12 MONTHS, YOU WORRIED THAT YOUR FOOD WOULD RUN OUT BEFORE YOU GOT MONEY TO BUY MORE.: NEVER TRUE

## 2021-06-14 ASSESSMENT — SOCIAL DETERMINANTS OF HEALTH (SDOH): HOW HARD IS IT FOR YOU TO PAY FOR THE VERY BASICS LIKE FOOD, HOUSING, MEDICAL CARE, AND HEATING?: NOT HARD AT ALL

## 2021-06-14 ASSESSMENT — ENCOUNTER SYMPTOMS
DIARRHEA: 0
PHOTOPHOBIA: 0
BACK PAIN: 0
SORE THROAT: 0
VOMITING: 0
SINUS PAIN: 0
ABDOMINAL PAIN: 0
BLOOD IN STOOL: 0
SHORTNESS OF BREATH: 0
COUGH: 0
TROUBLE SWALLOWING: 0
WHEEZING: 0
CONSTIPATION: 0
EYE REDNESS: 0

## 2021-06-14 ASSESSMENT — PATIENT HEALTH QUESTIONNAIRE - PHQ9
SUM OF ALL RESPONSES TO PHQ QUESTIONS 1-9: 0
SUM OF ALL RESPONSES TO PHQ QUESTIONS 1-9: 0
2. FEELING DOWN, DEPRESSED OR HOPELESS: 0
SUM OF ALL RESPONSES TO PHQ9 QUESTIONS 1 & 2: 0
SUM OF ALL RESPONSES TO PHQ QUESTIONS 1-9: 0
1. LITTLE INTEREST OR PLEASURE IN DOING THINGS: 0

## 2021-06-14 NOTE — PROGRESS NOTES
OFFICE NOTE    21  Name: Jayshree Engel  :1935   Sex:male   Age:85 y.o. SUBJECTIVE  Chief Complaint   Patient presents with    6 Month Follow-Up     med ck, refills, no complaints       HPI comes in for checkup and reflills. Has been helping wife convalesce from knee surgery. Works part time at [employer]   Constitutional: Positive for fatigue. Negative for appetite change, fever and unexpected weight change. HENT: Negative for congestion, ear pain, hearing loss, sinus pain, sore throat and trouble swallowing. Eyes: Negative for photophobia, redness and visual disturbance. Respiratory: Negative for cough, shortness of breath and wheezing. Cardiovascular: Negative for chest pain, palpitations and leg swelling. Gastrointestinal: Negative for abdominal pain, blood in stool, constipation, diarrhea and vomiting. Endocrine: Negative for cold intolerance, polydipsia and polyuria. Genitourinary: Negative for difficulty urinating, genital sores, hematuria and urgency. Musculoskeletal: Positive for arthralgias. Negative for back pain and joint swelling. Allergic/Immunologic: Negative for food allergies. Neurological: Negative for dizziness, tremors, seizures, syncope, numbness and headaches. Hematological: Negative for adenopathy. Does not bruise/bleed easily. Psychiatric/Behavioral: Negative for agitation, dysphoric mood, hallucinations and sleep disturbance. The patient is not nervous/anxious.              Current Outpatient Medications:     tamsulosin (FLOMAX) 0.4 MG capsule, Take 1 capsule by mouth 2 times daily, Disp: 180 capsule, Rfl: 1    simvastatin (ZOCOR) 40 MG tablet, Take 1 tablet by mouth nightly, Disp: 90 tablet, Rfl: 1    metoprolol tartrate (LOPRESSOR) 25 MG tablet, Take 1 tablet by mouth 2 times daily, Disp: 180 tablet, Rfl: 1    nitroGLYCERIN (NITROSTAT) 0.4 MG SL tablet, 1 tab sublingual with chest pain - maximum of 3, 10 minutes apart before going to the ER for evaluation, Disp: 25 tablet, Rfl: 0    aspirin 81 MG tablet, Take 1 tablet by mouth daily Only taking once every 2 months, Disp: , Rfl:   No Known Allergies    Past Medical History:   Diagnosis Date    Amputation, finger, traumatic     3,4,5 from bandsaw; successful repair thumb and index finger    Aneurysm, abdominal aortic (HCC)     2.2 cm 2/2007    CAD (coronary artery disease) 04/2011    5 vessel CABG, emergent [employer] aftr cardiac arrest in ER    Cervicobrachial syndrome     Hyperlipidemia     Peyronie's disease     Radiculopathy, lumbar region     Renal cyst, left      Past Surgical History:   Procedure Laterality Date    COLONOSCOPY  02/26/2010    insignificant polyp; Dr. Trae Moreno, [facility]    CORONARY ARTERY BYPASS GRAFT  04/2011    5 vessel    OTHER SURGICAL HISTORY      repair thumb and index finger following accident with band saw     Family History   Problem Relation Age of Onset    Diabetes Mother     No Known Problems Father     Breast Cancer Sister     Other Brother         blood cancer    Lung Cancer Brother     Cancer Brother         unknown type    Other Sister         cerebral aneurysm, large benign duodenal polyp    No Known Problems Sister     No Known Problems Sister     No Known Problems Sister      Social History     Tobacco History     Smoking Status  Former Smoker Quit date  1/1/1940 Smoking Tobacco Type  Cigars    Smokeless Tobacco Use  Never Used    Tobacco Comment  very few          Alcohol History     Alcohol Use Status  Not Currently Comment  rarely          Drug Use     Drug Use Status  Never          Sexual Activity     Sexually Active  Not Currently Partners  Female                OBJECTIVE  Vitals:    06/14/21 0909   BP: 130/70   Pulse: 83   Resp: 18   Temp: 97.7 °F (36.5 °C)   TempSrc: Temporal   SpO2: 97%   Weight: 190 lb (86.2 kg)   Height: 5' 6\" (1.676 m)        Body mass index is 30.67 kg/m².     Orders Placed This Encounter   Procedures    EKG 12 Lead     Standing Status:   Future     Number of Occurrences:   1     Standing Expiration Date:   6/14/2022     Order Specific Question:   Reason for Exam?     Answer:   Hypertension    77723 - [place] DESTRUC PREMALIGNANT, FIRST LESION    20744 - LA DESTRUC PREMALIGNANT,2-14 LESIONS        EXAM   Physical Exam  Vitals and nursing note reviewed. Constitutional:       Appearance: Normal appearance. He is well-developed and normal weight. Comments: overweight   HENT:      Right Ear: Tympanic membrane, ear canal and external ear normal.      Left Ear: Tympanic membrane, ear canal and external ear normal.      Nose: Nose normal.      Mouth/Throat:      Pharynx: Oropharynx is clear. No posterior oropharyngeal erythema. Eyes:      Conjunctiva/sclera: Conjunctivae normal.      Pupils: Pupils are equal, round, and reactive to light. Neck:      Thyroid: No thyroid mass or thyromegaly. Vascular: No carotid bruit or JVD. Trachea: Trachea normal.   Cardiovascular:      Rate and Rhythm: Normal rate and regular rhythm. Pulses: Normal pulses. Heart sounds: Normal heart sounds. No murmur heard. No gallop. Pulmonary:      Effort: Pulmonary effort is normal.      Breath sounds: Normal breath sounds. No wheezing, rhonchi or rales. Abdominal:      General: Bowel sounds are normal. There is no distension. Palpations: Abdomen is soft. There is no mass. Tenderness: There is no abdominal tenderness. There is no guarding. Hernia: No hernia is present. Musculoskeletal:         General: No swelling. Normal range of motion. Cervical back: Neck supple. Right lower leg: No edema. Left lower leg: No edema. Comments: Partial amputation 3 fingers on right hand   Lymphadenopathy:      Cervical: No cervical adenopathy. Skin:     General: Skin is warm and dry. Capillary Refill: Capillary refill takes less than 2 seconds.       Coloration: Skin is not jaundiced. Findings: Lesion present. No bruising or rash. Comments: Multiple AKs preauricular both sides   Neurological:      General: No focal deficit present. Mental Status: He is alert and oriented to person, place, and time. Sensory: No sensory deficit. Motor: No weakness or abnormal muscle tone. Coordination: Coordination normal.      Gait: Gait normal.   Psychiatric:         Mood and Affect: Mood normal.         Behavior: Behavior normal.           Bud Mathew was seen today for 6 month follow-up. Diagnoses and all orders for this visit:    Actinic keratoses  -     82857 - [place] DESTRUC PREMALIGNANT, FIRST LESION  -     54797 - [place] DESTRUC PREMALIGNANT,2-14 LESIONS  See PE. 5 AKs in total cryo with verucca freeze  Benign prostatic hyperplasia with urinary frequency  -     tamsulosin (FLOMAX) 0.4 MG capsule; Take 1 capsule by mouth 2 times daily    Coronary artery disease involving native coronary artery of native heart without angina pectoris  -     simvastatin (ZOCOR) 40 MG tablet; Take 1 tablet by mouth nightly  -     metoprolol tartrate (LOPRESSOR) 25 MG tablet; Take 1 tablet by mouth 2 times daily  -     Cancel: EKG 12 lead  -     EKG 12 Lead; Future  -     EKG 12 Lead   no ischemic symptoms noted, EKG looks good  Other hyperlipidemia  -     simvastatin (ZOCOR) 40 MG tablet; Take 1 tablet by mouth nightly  -     metoprolol tartrate (LOPRESSOR) 25 MG tablet; Take 1 tablet by mouth 2 times daily  -     Cancel: EKG 12 lead    Essential hypertension  -     EKG 12 Lead; Future  -     EKG 12 Lead  Well controlled no changes made        Return in about 6 months (around 12/14/2021).     Electronically signed by John Chavez MD on 6/14/21 at 9:18 AM EDT

## 2021-12-14 ENCOUNTER — OFFICE VISIT (OUTPATIENT)
Dept: FAMILY MEDICINE CLINIC | Age: 86
End: 2021-12-14
Payer: MEDICARE

## 2021-12-14 VITALS
BODY MASS INDEX: 29.7 KG/M2 | DIASTOLIC BLOOD PRESSURE: 66 MMHG | OXYGEN SATURATION: 94 % | TEMPERATURE: 96.8 F | SYSTOLIC BLOOD PRESSURE: 122 MMHG | HEART RATE: 93 BPM | WEIGHT: 184 LBS

## 2021-12-14 DIAGNOSIS — R35.0 BENIGN PROSTATIC HYPERPLASIA WITH URINARY FREQUENCY: ICD-10-CM

## 2021-12-14 DIAGNOSIS — N40.1 BENIGN PROSTATIC HYPERPLASIA WITH URINARY FREQUENCY: ICD-10-CM

## 2021-12-14 DIAGNOSIS — I25.10 CORONARY ARTERY DISEASE INVOLVING NATIVE CORONARY ARTERY OF NATIVE HEART WITHOUT ANGINA PECTORIS: ICD-10-CM

## 2021-12-14 DIAGNOSIS — E78.49 OTHER HYPERLIPIDEMIA: ICD-10-CM

## 2021-12-14 LAB
ALBUMIN SERPL-MCNC: 4.1 G/DL (ref 3.5–5.2)
ALP BLD-CCNC: 106 U/L (ref 40–129)
ALT SERPL-CCNC: 15 U/L (ref 0–40)
ANION GAP SERPL CALCULATED.3IONS-SCNC: 17 MMOL/L (ref 7–16)
ANISOCYTOSIS: ABNORMAL
AST SERPL-CCNC: 21 U/L (ref 0–39)
BACTERIA: ABNORMAL /HPF
BASOPHILS ABSOLUTE: 0.06 E9/L (ref 0–0.2)
BASOPHILS RELATIVE PERCENT: 0.3 % (ref 0–2)
BILIRUB SERPL-MCNC: 1.1 MG/DL (ref 0–1.2)
BILIRUBIN URINE: NEGATIVE
BLOOD, URINE: ABNORMAL
BUN BLDV-MCNC: 19 MG/DL (ref 6–23)
BURR CELLS: ABNORMAL
CALCIUM SERPL-MCNC: 9.5 MG/DL (ref 8.6–10.2)
CHLORIDE BLD-SCNC: 100 MMOL/L (ref 98–107)
CHOLESTEROL, TOTAL: 167 MG/DL (ref 0–199)
CLARITY: ABNORMAL
CO2: 21 MMOL/L (ref 22–29)
COLOR: YELLOW
CREAT SERPL-MCNC: 1.2 MG/DL (ref 0.7–1.2)
EOSINOPHILS ABSOLUTE: 0 E9/L (ref 0.05–0.5)
EOSINOPHILS RELATIVE PERCENT: 0 % (ref 0–6)
GFR AFRICAN AMERICAN: >60
GFR NON-AFRICAN AMERICAN: 57 ML/MIN/1.73
GLUCOSE BLD-MCNC: 121 MG/DL (ref 74–99)
GLUCOSE URINE: NEGATIVE MG/DL
HCT VFR BLD CALC: 41 % (ref 37–54)
HDLC SERPL-MCNC: 54 MG/DL
HEMOGLOBIN: 13.5 G/DL (ref 12.5–16.5)
IMMATURE GRANULOCYTES #: 0.12 E9/L
IMMATURE GRANULOCYTES %: 0.7 % (ref 0–5)
KETONES, URINE: NEGATIVE MG/DL
LDL CHOLESTEROL CALCULATED: 99 MG/DL (ref 0–99)
LEUKOCYTE ESTERASE, URINE: ABNORMAL
LYMPHOCYTES ABSOLUTE: 0.65 E9/L (ref 1.5–4)
LYMPHOCYTES RELATIVE PERCENT: 3.7 % (ref 20–42)
MCH RBC QN AUTO: 29.8 PG (ref 26–35)
MCHC RBC AUTO-ENTMCNC: 32.9 % (ref 32–34.5)
MCV RBC AUTO: 90.5 FL (ref 80–99.9)
MONOCYTES ABSOLUTE: 1.92 E9/L (ref 0.1–0.95)
MONOCYTES RELATIVE PERCENT: 10.8 % (ref 2–12)
NEUTROPHILS ABSOLUTE: 14.99 E9/L (ref 1.8–7.3)
NEUTROPHILS RELATIVE PERCENT: 84.5 % (ref 43–80)
NITRITE, URINE: POSITIVE
PDW BLD-RTO: 13 FL (ref 11.5–15)
PH UA: 5.5 (ref 5–9)
PLATELET # BLD: 206 E9/L (ref 130–450)
PMV BLD AUTO: 10.4 FL (ref 7–12)
POIKILOCYTES: ABNORMAL
POLYCHROMASIA: ABNORMAL
POTASSIUM SERPL-SCNC: 4.5 MMOL/L (ref 3.5–5)
PROTEIN UA: NEGATIVE MG/DL
RBC # BLD: 4.53 E12/L (ref 3.8–5.8)
RBC UA: ABNORMAL /HPF (ref 0–2)
SODIUM BLD-SCNC: 138 MMOL/L (ref 132–146)
SPECIFIC GRAVITY UA: 1.02 (ref 1–1.03)
TOTAL PROTEIN: 8 G/DL (ref 6.4–8.3)
TRIGL SERPL-MCNC: 69 MG/DL (ref 0–149)
TSH SERPL DL<=0.05 MIU/L-ACNC: 2.16 UIU/ML (ref 0.27–4.2)
UROBILINOGEN, URINE: 0.2 E.U./DL
VLDLC SERPL CALC-MCNC: 14 MG/DL
WBC # BLD: 17.7 E9/L (ref 4.5–11.5)
WBC UA: ABNORMAL /HPF (ref 0–5)

## 2021-12-14 PROCEDURE — 1123F ACP DISCUSS/DSCN MKR DOCD: CPT | Performed by: FAMILY MEDICINE

## 2021-12-14 PROCEDURE — 99214 OFFICE O/P EST MOD 30 MIN: CPT | Performed by: FAMILY MEDICINE

## 2021-12-14 PROCEDURE — G8427 DOCREV CUR MEDS BY ELIG CLIN: HCPCS | Performed by: FAMILY MEDICINE

## 2021-12-14 PROCEDURE — G0008 ADMIN INFLUENZA VIRUS VAC: HCPCS | Performed by: FAMILY MEDICINE

## 2021-12-14 PROCEDURE — G8484 FLU IMMUNIZE NO ADMIN: HCPCS | Performed by: FAMILY MEDICINE

## 2021-12-14 PROCEDURE — G8417 CALC BMI ABV UP PARAM F/U: HCPCS | Performed by: FAMILY MEDICINE

## 2021-12-14 PROCEDURE — 90694 VACC AIIV4 NO PRSRV 0.5ML IM: CPT | Performed by: FAMILY MEDICINE

## 2021-12-14 PROCEDURE — 4040F PNEUMOC VAC/ADMIN/RCVD: CPT | Performed by: FAMILY MEDICINE

## 2021-12-14 PROCEDURE — 1036F TOBACCO NON-USER: CPT | Performed by: FAMILY MEDICINE

## 2021-12-14 RX ORDER — SIMVASTATIN 40 MG
40 TABLET ORAL NIGHTLY
Qty: 90 TABLET | Refills: 1 | Status: SHIPPED
Start: 2021-12-14 | End: 2022-06-22 | Stop reason: SDUPTHER

## 2021-12-14 RX ORDER — TAMSULOSIN HYDROCHLORIDE 0.4 MG/1
0.4 CAPSULE ORAL 2 TIMES DAILY
Qty: 180 CAPSULE | Refills: 1 | Status: SHIPPED
Start: 2021-12-14 | End: 2022-06-22 | Stop reason: SDUPTHER

## 2021-12-14 ASSESSMENT — ENCOUNTER SYMPTOMS
SHORTNESS OF BREATH: 0
PHOTOPHOBIA: 0
BACK PAIN: 0
SINUS PAIN: 0
WHEEZING: 0
ABDOMINAL PAIN: 0
COUGH: 0
VOMITING: 0
DIARRHEA: 0
TROUBLE SWALLOWING: 0
BLOOD IN STOOL: 0
CONSTIPATION: 0
SORE THROAT: 0
EYE REDNESS: 0

## 2021-12-14 NOTE — PROGRESS NOTES
OFFICE NOTE    12/14/21  Name: Aicha Julian  YSR:[id number]   Sex:male   Age:86 y.o. SUBJECTIVE  Chief Complaint   Patient presents with    Hypertension    Hyperlipidemia    Fall     falling frequently       HPI   [place] [facility] [place] [facility]. 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 Was falling in tall grass next to apple trees, none since quit for season. Walks 3 miles a day at home in basement he says  Review of Systems   Constitutional: Positive for fatigue. Negative for appetite change, fever and unexpected weight change. HENT: Positive for hearing loss. Negative for congestion, ear pain, sinus pain, sore throat and trouble swallowing. Eyes: Negative for photophobia, redness and visual disturbance. Respiratory: Negative for cough, shortness of breath and wheezing. Cardiovascular: Negative for chest pain, palpitations and leg swelling. Gastrointestinal: Negative for abdominal pain, blood in stool, constipation, diarrhea and vomiting. Endocrine: Negative for cold intolerance, polydipsia and polyuria. Genitourinary: Positive for frequency and urgency. Negative for difficulty urinating, genital sores and hematuria. Nocturia. Says he has been drinking a lot of water so he doesn't eat as much. Musculoskeletal: Negative for arthralgias, back pain and joint swelling. Allergic/Immunologic: Negative for food allergies. Neurological: Negative for dizziness, tremors, syncope and headaches. Hematological: Negative for adenopathy. Does not bruise/bleed easily. Psychiatric/Behavioral: Negative for agitation, dysphoric mood, hallucinations and sleep disturbance. All other systems reviewed and are negative.            Current Outpatient Medications:     tamsulosin (FLOMAX) 0.4 MG capsule, Take 1 capsule by mouth 2 times daily, Disp: 180 capsule, Rfl: 1    simvastatin (ZOCOR) 40 MG tablet, Take 1 tablet by mouth nightly, Disp: 90 tablet, Rfl: 1   metoprolol tartrate (LOPRESSOR) 25 MG tablet, Take 1 tablet by mouth 2 times daily, Disp: 180 tablet, Rfl: 1    nitroGLYCERIN (NITROSTAT) 0.4 MG SL tablet, 1 tab sublingual with chest pain - maximum of 3, 10 minutes apart before going to the ER for evaluation, Disp: 25 tablet, Rfl: 0    aspirin 81 MG tablet, Take 1 tablet by mouth daily Only taking once every 2 months, Disp: , Rfl:   No Known Allergies    Past Medical History:   Diagnosis Date    Amputation, finger, traumatic     3,4,5 from bandsaw; successful repair thumb and index finger    Aneurysm, abdominal aortic (HCC)     2.2 cm 2/2007    CAD (coronary artery disease) 04/2011    5 vessel CABG, emergent [employer] aftr cardiac arrest in ER    Cervicobrachial syndrome     Hyperlipidemia     Peyronie's disease     Radiculopathy, lumbar region     Renal cyst, left      Past Surgical History:   Procedure Laterality Date    COLONOSCOPY  02/26/2010    insignificant polyp; [facility], [facility]    CORONARY ARTERY BYPASS GRAFT  04/2011    5 vessel    OTHER SURGICAL HISTORY      repair thumb and index finger following accident with band saw     Family History   Problem Relation Age of Onset    Diabetes Mother     No Known Problems Father     Breast Cancer Sister     Other Brother         blood cancer    Lung Cancer Brother     Cancer Brother         unknown type    Other Sister         cerebral aneurysm, large benign duodenal polyp    No Known Problems Sister     No Known Problems Sister     No Known Problems Sister      Social History     Tobacco History     Smoking Status  Former Smoker Quit date  1/1/1940 Smoking Tobacco Type  Cigars    Smokeless Tobacco Use  Never Used    Tobacco Comment  very few          Alcohol History     Alcohol Use Status  Not Currently Comment  rarely          Drug Use     Drug Use Status  Never          Sexual Activity     Sexually Active  Not Currently Partners  Female                OBJECTIVE  Vitals: 12/14/21 0910   BP: 122/66   Pulse: 93   Temp: 96.8 °F (36 °C)   TempSrc: Temporal   SpO2: 94%   Weight: 184 lb (83.5 kg)        Body mass index is 29.7 kg/m². Orders Placed This Encounter   Procedures    INFLUENZA, QUADV, ADJUVANTED, 72 YRS =, IM, PF, PREFILL SYR, 0.5ML (FLUAD)    CBC Auto Differential     Standing Status:   Future     Number of Occurrences:   1     Standing Expiration Date:   12/14/2022    Comprehensive Metabolic Panel     Standing Status:   Future     Number of Occurrences:   1     Standing Expiration Date:   12/14/2022    Lipid Panel     Standing Status:   Future     Number of Occurrences:   1     Standing Expiration Date:   12/14/2022     Order Specific Question:   Is Patient Fasting?/# of Hours     Answer:   fasting    TSH without Reflex     Standing Status:   Future     Number of Occurrences:   1     Standing Expiration Date:   12/14/2022    URINALYSIS     Standing Status:   Future     Number of Occurrences:   1     Standing Expiration Date:   12/14/2022    POCT Fit Test     Standing Status:   Future     Standing Expiration Date:   12/14/2022        EXAM   Physical Exam  Vitals and nursing note reviewed. Constitutional:       Appearance: Normal appearance. He is well-developed. Comments: overweight   HENT:      Right Ear: Tympanic membrane, ear canal and external ear normal.      Left Ear: Tympanic membrane, ear canal and external ear normal.      Nose: Congestion present. Mouth/Throat:      Pharynx: Oropharynx is clear. No posterior oropharyngeal erythema. Eyes:      General: No scleral icterus. Conjunctiva/sclera: Conjunctivae normal.      Pupils: Pupils are equal, round, and reactive to light. Neck:      Thyroid: No thyroid mass or thyromegaly. Vascular: No carotid bruit or JVD. Trachea: Trachea normal.   Cardiovascular:      Rate and Rhythm: Normal rate and regular rhythm. Pulses: Normal pulses. Heart sounds: Normal heart sounds.  No murmur heard.  No gallop. Pulmonary:      Effort: Pulmonary effort is normal.      Breath sounds: Normal breath sounds. No wheezing, rhonchi or rales. Abdominal:      General: Bowel sounds are normal. There is no distension. Palpations: Abdomen is soft. There is no mass. Tenderness: There is no abdominal tenderness. There is no guarding. Hernia: No hernia is present. Genitourinary:     Rectum: Normal. Guaiac result negative. Comments: Prostate symmetrically enlarged  Musculoskeletal:         General: No swelling or tenderness. Normal range of motion. Cervical back: Neck supple. No tenderness. Right lower leg: No edema. Left lower leg: No edema. Comments: Partial amputation 3 fingers left hand   Lymphadenopathy:      Cervical: No cervical adenopathy. Skin:     General: Skin is warm and dry. Findings: No rash. Neurological:      General: No focal deficit present. Mental Status: He is alert and oriented to person, place, and time. Sensory: No sensory deficit. Motor: No weakness or abnormal muscle tone. Coordination: Coordination normal.      Gait: Gait normal.   Psychiatric:         Mood and Affect: Mood normal.         Behavior: Behavior normal.           Chadd Weber was seen today for hypertension, hyperlipidemia and fall. Diagnoses and all orders for this visit:    Benign prostatic hyperplasia with urinary frequency  -     tamsulosin (FLOMAX) 0.4 MG capsule; Take 1 capsule by mouth 2 times daily  -     URINALYSIS; Future  -     POCT Fit Test; Future  Sees Urology regularly. Physical exam c/w BPH. On tamsulosin  Coronary artery disease involving native coronary artery of native heart without angina pectoris  -     simvastatin (ZOCOR) 40 MG tablet; Take 1 tablet by mouth nightly  -     metoprolol tartrate (LOPRESSOR) 25 MG tablet; Take 1 tablet by mouth 2 times daily  -     CBC Auto Differential; Future  -     Comprehensive Metabolic Panel;  Future  Sees Dr. Gisela Charles at [facility] [place]  Other hyperlipidemia  -     simvastatin (ZOCOR) 40 MG tablet; Take 1 tablet by mouth nightly  -     metoprolol tartrate (LOPRESSOR) 25 MG tablet; Take 1 tablet by mouth 2 times daily  -     Lipid Panel; Future  -     TSH without Reflex; Future    Other orders  -     INFLUENZA, QUADV, ADJUVANTED, 65 YRS =, IM, PF, PREFILL SYR, 0.5ML (FLUAD)          Return in about 6 months (around 6/14/2022).     Electronically signed by Addie Galvez MD on 12/14/21 at 9:38 AM EST

## 2021-12-16 DIAGNOSIS — N30.00 ACUTE CYSTITIS WITHOUT HEMATURIA: Primary | ICD-10-CM

## 2021-12-16 DIAGNOSIS — R35.1 BPH ASSOCIATED WITH NOCTURIA: ICD-10-CM

## 2021-12-16 DIAGNOSIS — N40.1 BPH ASSOCIATED WITH NOCTURIA: ICD-10-CM

## 2021-12-16 RX ORDER — CIPROFLOXACIN 500 MG/1
500 TABLET, FILM COATED ORAL 2 TIMES DAILY
Qty: 14 TABLET | Refills: 0 | Status: SHIPPED | OUTPATIENT
Start: 2021-12-16 | End: 2021-12-23

## 2021-12-20 DIAGNOSIS — R35.0 BENIGN PROSTATIC HYPERPLASIA WITH URINARY FREQUENCY: ICD-10-CM

## 2021-12-20 DIAGNOSIS — N40.1 BENIGN PROSTATIC HYPERPLASIA WITH URINARY FREQUENCY: ICD-10-CM

## 2021-12-20 LAB
CONTROL: PRESENT
HEMOCCULT STL QL: NEGATIVE

## 2022-03-28 DIAGNOSIS — I25.10 CORONARY ARTERY DISEASE INVOLVING NATIVE CORONARY ARTERY OF NATIVE HEART WITHOUT ANGINA PECTORIS: ICD-10-CM

## 2022-03-28 RX ORDER — NITROGLYCERIN 0.4 MG/1
TABLET SUBLINGUAL
Qty: 25 TABLET | Refills: 0 | Status: SHIPPED | OUTPATIENT
Start: 2022-03-28

## 2022-05-03 ENCOUNTER — OFFICE VISIT (OUTPATIENT)
Dept: FAMILY MEDICINE CLINIC | Age: 87
End: 2022-05-03
Payer: MEDICARE

## 2022-05-03 VITALS
RESPIRATION RATE: 24 BRPM | OXYGEN SATURATION: 95 % | SYSTOLIC BLOOD PRESSURE: 128 MMHG | BODY MASS INDEX: 29.89 KG/M2 | TEMPERATURE: 100.4 F | WEIGHT: 186 LBS | HEIGHT: 66 IN | DIASTOLIC BLOOD PRESSURE: 68 MMHG | HEART RATE: 81 BPM

## 2022-05-03 DIAGNOSIS — R50.9 FEVER, UNSPECIFIED FEVER CAUSE: ICD-10-CM

## 2022-05-03 DIAGNOSIS — J06.9 UPPER RESPIRATORY INFECTION WITH COUGH AND CONGESTION: Primary | ICD-10-CM

## 2022-05-03 DIAGNOSIS — R05.9 COUGH: ICD-10-CM

## 2022-05-03 PROCEDURE — G8427 DOCREV CUR MEDS BY ELIG CLIN: HCPCS | Performed by: NURSE PRACTITIONER

## 2022-05-03 PROCEDURE — 99214 OFFICE O/P EST MOD 30 MIN: CPT | Performed by: NURSE PRACTITIONER

## 2022-05-03 PROCEDURE — G8417 CALC BMI ABV UP PARAM F/U: HCPCS | Performed by: NURSE PRACTITIONER

## 2022-05-03 PROCEDURE — 1123F ACP DISCUSS/DSCN MKR DOCD: CPT | Performed by: NURSE PRACTITIONER

## 2022-05-03 PROCEDURE — 1036F TOBACCO NON-USER: CPT | Performed by: NURSE PRACTITIONER

## 2022-05-03 PROCEDURE — 4040F PNEUMOC VAC/ADMIN/RCVD: CPT | Performed by: NURSE PRACTITIONER

## 2022-05-03 RX ORDER — CEFDINIR 300 MG/1
300 CAPSULE ORAL 2 TIMES DAILY
Qty: 20 CAPSULE | Refills: 0 | Status: SHIPPED | OUTPATIENT
Start: 2022-05-03 | End: 2022-05-13

## 2022-05-03 NOTE — PROGRESS NOTES
Chief Complaint:   URI (Productive cough with yellow phlegm, chest congestion, post-nasal drip x 2 weeks)    History of Present Illness   Source of history provided by:  patient. Le Viveros is a 81 y.o. old male who presents to walk-in with a cough for the past 2 weeks. States the cough is productive with yellow sputum. Fever in office today 100.4F. Reports associated chest congestion and PND. Denies any N/V/D, abdominal pain, CP, progressive SOB, dizziness, or lethargy. Has been taking OTC Cough medicine without relief of symptoms. Review of Systems    Unless otherwise stated in this report or unable to obtain because of the patient's clinical or mental status as evidenced by the medical record, this patients's positive and negative responses for Review of Systems, constitutional, psych, eyes, ENT, cardiovascular, respiratory, gastrointestinal, neurological, genitourinary, musculoskeletal, integument systems and systems related to the presenting problem are either stated in the preceding or were not pertinent or were negative for the symptoms and/or complaints related to the medical problem. Past Medical History:  has a past medical history of Amputation, finger, traumatic, Aneurysm, abdominal aortic ([address] CAD (coronary artery disease), Cervicobrachial syndrome, Hyperlipidemia, Peyronie's disease, Radiculopathy, lumbar region, and Renal cyst, left. Past Surgical History:  has a past surgical history that includes other surgical history; Colonoscopy (02/26/2010); and Coronary artery bypass graft (04/2011). Social History:  reports that he quit smoking about 82 years ago. His smoking use included cigars. He has never used smokeless tobacco. He reports previous alcohol use. He reports that he does not use drugs.   Family History: family history includes Breast Cancer in his sister; Cancer in his brother; Diabetes in his mother; Jenny Pagoda in his brother; No Known Problems in his father, sister, sister, and sister; Other in his brother and sister. Allergies: Patient has no known allergies. Physical Exam   Vital Signs:  /68 (Site: Left Upper Arm, Position: Sitting, Cuff Size: Large Adult)   Pulse 81   Temp 100.4 °F (38 °C) (Temporal)   Resp 24   Ht 5' 6\" (1.676 m)   Wt 186 lb (84.4 kg)   SpO2 95%   BMI 30.02 kg/m²    Oxygen Saturation Interpretation: Normal.    Constitutional:  Alert, development consistent with age. Ears:  External Ears: Normal bilateral pinna. TM's & External Canals: TM's normal bilaterally without perforation. Canals without erythema or drainage. Nose:   There is no obvious septal defect. Mouth:  Moist bucca mucosa and normal tongue. Throat: Mild posterior pharyngeal erythema without exudates or lesions. Neck:  Supple. There is no obvious adenopathy or neck tenderness. Lungs:   Breath sounds: Normal chest expansion and breath sounds noted throughout. No wheezes, rales, or rhonchi noted. Heart:  Regular rate and rhythm, normal heart sounds, without pathological murmurs, ectopy, gallops, or rubs. Abdomen:  Soft, nontender, good bowel sounds. No firm or pulsatile mass. Skin:  Normal turgor. Warm, dry, without visible rash. Neurological:  Oriented. Motor functions intact. Test Results Section   (All laboratory and radiology results have been personally reviewed by myself)  Labs:  No results found for this visit on 05/03/22. Imaging: All Radiology results interpreted by Radiologist unless otherwise noted. XR CHEST (2 VW)    Result Date: 5/3/2022  EXAMINATION: TWO XRAY VIEWS OF THE CHEST 5/3/2022 9:17 am COMPARISON: None. HISTORY: ORDERING SYSTEM PROVIDED HISTORY: Cough TECHNOLOGIST PROVIDED HISTORY: Reason for exam:->cough x 2 weeks - r/o pneumonia FINDINGS: The lungs are without acute focal process. There is no effusion or pneumothorax. The cardiomediastinal silhouette is without acute process.  The osseous structures are without acute process. No acute process. Assessment / Plan   Impression(s):  Lanny Sheets was seen today for uri. Diagnoses and all orders for this visit:    Upper respiratory infection with cough and congestion  -     cefdinir (OMNICEF) 300 MG capsule; Take 1 capsule by mouth 2 times daily for 10 days    Cough  -     XR CHEST (2 VW); Future    Fever, unspecified fever cause  -     XR CHEST (2 VW); Future    Chest x-ray obtained in office show no acute process, patient advised result. Prescription sent for cefdinir, side effects discussed. Pt advised to f/u with PCP in 7-10 days for recheck if symptoms persist.  ER if changes or worse. Red flag symptoms discussed with patient. Advised to take all medications as directed. Patient verbalized understanding and agreeable to plan of care. All questions answered. Return if symptoms worsen or fail to improve. Electronically signed by LEON Roland CNP   DD: 5/3/22    **This report was transcribed using voice recognition software. Every effort was made to ensure accuracy; however, inadvertent computerized transcription errors may be present.

## 2022-06-22 ENCOUNTER — OFFICE VISIT (OUTPATIENT)
Dept: FAMILY MEDICINE CLINIC | Age: 87
End: 2022-06-22
Payer: MEDICARE

## 2022-06-22 VITALS
DIASTOLIC BLOOD PRESSURE: 68 MMHG | HEART RATE: 100 BPM | HEIGHT: 66 IN | RESPIRATION RATE: 19 BRPM | BODY MASS INDEX: 29.63 KG/M2 | WEIGHT: 184.4 LBS | SYSTOLIC BLOOD PRESSURE: 138 MMHG | TEMPERATURE: 97.1 F | OXYGEN SATURATION: 98 %

## 2022-06-22 DIAGNOSIS — I25.10 CORONARY ARTERY DISEASE INVOLVING NATIVE CORONARY ARTERY OF NATIVE HEART WITHOUT ANGINA PECTORIS: ICD-10-CM

## 2022-06-22 DIAGNOSIS — E78.49 OTHER HYPERLIPIDEMIA: ICD-10-CM

## 2022-06-22 DIAGNOSIS — R35.0 BENIGN PROSTATIC HYPERPLASIA WITH URINARY FREQUENCY: ICD-10-CM

## 2022-06-22 DIAGNOSIS — N40.1 BENIGN PROSTATIC HYPERPLASIA WITH URINARY FREQUENCY: ICD-10-CM

## 2022-06-22 PROCEDURE — 93000 ELECTROCARDIOGRAM COMPLETE: CPT | Performed by: FAMILY MEDICINE

## 2022-06-22 PROCEDURE — G8427 DOCREV CUR MEDS BY ELIG CLIN: HCPCS | Performed by: FAMILY MEDICINE

## 2022-06-22 PROCEDURE — 99214 OFFICE O/P EST MOD 30 MIN: CPT | Performed by: FAMILY MEDICINE

## 2022-06-22 PROCEDURE — 1123F ACP DISCUSS/DSCN MKR DOCD: CPT | Performed by: FAMILY MEDICINE

## 2022-06-22 PROCEDURE — G8417 CALC BMI ABV UP PARAM F/U: HCPCS | Performed by: FAMILY MEDICINE

## 2022-06-22 PROCEDURE — 1036F TOBACCO NON-USER: CPT | Performed by: FAMILY MEDICINE

## 2022-06-22 RX ORDER — TAMSULOSIN HYDROCHLORIDE 0.4 MG/1
0.4 CAPSULE ORAL 2 TIMES DAILY
Qty: 180 CAPSULE | Refills: 1 | Status: SHIPPED | OUTPATIENT
Start: 2022-06-22

## 2022-06-22 RX ORDER — SIMVASTATIN 40 MG
40 TABLET ORAL NIGHTLY
Qty: 90 TABLET | Refills: 1 | Status: SHIPPED | OUTPATIENT
Start: 2022-06-22

## 2022-06-22 SDOH — ECONOMIC STABILITY: FOOD INSECURITY: WITHIN THE PAST 12 MONTHS, THE FOOD YOU BOUGHT JUST DIDN'T LAST AND YOU DIDN'T HAVE MONEY TO GET MORE.: NEVER TRUE

## 2022-06-22 SDOH — ECONOMIC STABILITY: FOOD INSECURITY: WITHIN THE PAST 12 MONTHS, YOU WORRIED THAT YOUR FOOD WOULD RUN OUT BEFORE YOU GOT MONEY TO BUY MORE.: NEVER TRUE

## 2022-06-22 ASSESSMENT — ENCOUNTER SYMPTOMS
DIARRHEA: 0
COUGH: 0
SINUS PAIN: 0
TROUBLE SWALLOWING: 0
EYE REDNESS: 0
SHORTNESS OF BREATH: 0
BLOOD IN STOOL: 0
SORE THROAT: 0
ABDOMINAL PAIN: 0
VOMITING: 0
BACK PAIN: 0
CONSTIPATION: 0
WHEEZING: 0
PHOTOPHOBIA: 0

## 2022-06-22 ASSESSMENT — SOCIAL DETERMINANTS OF HEALTH (SDOH): HOW HARD IS IT FOR YOU TO PAY FOR THE VERY BASICS LIKE FOOD, HOUSING, MEDICAL CARE, AND HEATING?: NOT HARD AT ALL

## 2022-06-22 ASSESSMENT — LIFESTYLE VARIABLES
HOW OFTEN DO YOU HAVE A DRINK CONTAINING ALCOHOL: NEVER
HOW MANY STANDARD DRINKS CONTAINING ALCOHOL DO YOU HAVE ON A TYPICAL DAY: PATIENT DECLINED

## 2022-06-22 ASSESSMENT — PATIENT HEALTH QUESTIONNAIRE - PHQ9
SUM OF ALL RESPONSES TO PHQ9 QUESTIONS 1 & 2: 0
SUM OF ALL RESPONSES TO PHQ QUESTIONS 1-9: 0
2. FEELING DOWN, DEPRESSED OR HOPELESS: 0
SUM OF ALL RESPONSES TO PHQ QUESTIONS 1-9: 0
SUM OF ALL RESPONSES TO PHQ QUESTIONS 1-9: 0
1. LITTLE INTEREST OR PLEASURE IN DOING THINGS: 0
SUM OF ALL RESPONSES TO PHQ QUESTIONS 1-9: 0

## 2022-06-22 NOTE — PROGRESS NOTES
OFFICE NOTE    6/22/22  Name: Amalia Rojas  MBZ:[id number]   Sex:male   Age:86 y.o. SUBJECTIVE  Chief Complaint   Patient presents with    Medication Refill       HPI continues to work at [employer], generally feels well    Review of Systems   Constitutional: Positive for fatigue. Negative for appetite change, fever and unexpected weight change. HENT: Positive for congestion. Negative for ear pain, hearing loss, sinus pain, sore throat and trouble swallowing. Eyes: Negative for photophobia, redness and visual disturbance. Respiratory: Negative for cough, shortness of breath and wheezing. Cardiovascular: Negative for chest pain, palpitations and leg swelling. Gastrointestinal: Negative for abdominal pain, blood in stool, constipation, diarrhea and vomiting. Endocrine: Negative for cold intolerance, polydipsia and polyuria. Genitourinary: Positive for frequency and urgency. Negative for difficulty urinating, genital sores and hematuria. Musculoskeletal: Positive for arthralgias and myalgias. Negative for back pain and joint swelling. Skin: Negative for pallor and rash. Allergic/Immunologic: Negative for food allergies. Neurological: Negative for dizziness, tremors, seizures, syncope, numbness and headaches. Hematological: Negative for adenopathy. Does not bruise/bleed easily. Psychiatric/Behavioral: Negative for agitation, dysphoric mood, hallucinations and sleep disturbance. The patient is nervous/anxious. All other systems reviewed and are negative.            Current Outpatient Medications:     tamsulosin (FLOMAX) 0.4 MG capsule, Take 1 capsule by mouth 2 times daily, Disp: 180 capsule, Rfl: 1    simvastatin (ZOCOR) 40 MG tablet, Take 1 tablet by mouth nightly, Disp: 90 tablet, Rfl: 1    metoprolol tartrate (LOPRESSOR) 25 MG tablet, Take 1 tablet by mouth 2 times daily, Disp: 180 tablet, Rfl: 1    nitroGLYCERIN (NITROSTAT) 0.4 MG SL tablet, DISSOLVE 1 TABLET UNDER THE TONGUE FOR CHEST PAIN FOR A MAX OF 3 TABS 10 MINS APART BEFORE GOING TO THE ER FOR EVALUATION, Disp: 25 tablet, Rfl: 0    aspirin 81 MG tablet, Take 1 tablet by mouth daily Only taking once every 2 months, Disp: , Rfl:   No Known Allergies    Past Medical History:   Diagnosis Date    Amputation, finger, traumatic     3,4,5 from bandsaw; successful repair thumb and index finger    Aneurysm, abdominal aortic (HCC)     2.2 cm 2/2007    CAD (coronary artery disease) 04/2011    5 vessel CABG, emergent [employer] aftr cardiac arrest in ER    Cervicobrachial syndrome     Hyperlipidemia     Peyronie's disease     Radiculopathy, lumbar region     Renal cyst, left      Past Surgical History:   Procedure Laterality Date    COLONOSCOPY  02/26/2010    insignificant polyp; [facility] Rob [facility]    CORONARY ARTERY BYPASS GRAFT  04/2011    5 vessel    OTHER SURGICAL HISTORY      repair thumb and index finger following accident with band saw     Family History   Problem Relation Age of Onset    Diabetes Mother     No Known Problems Father     Breast Cancer Sister     Other Brother         blood cancer    Lung Cancer Brother     Cancer Brother         unknown type    Other Sister         cerebral aneurysm, large benign duodenal polyp    No Known Problems Sister     No Known Problems Sister     No Known Problems Sister      Social History     Tobacco History     Smoking Status  Former Smoker Quit date  1/1/1946 Smoking Tobacco Type  Cigars    Smokeless Tobacco Use  Never Used    Tobacco Comment  very few          Alcohol History     Alcohol Use Status  Not Currently Comment  rarely          Drug Use     Drug Use Status  Never          Sexual Activity     Sexually Active  Not Currently Partners  Female                OBJECTIVE  Vitals:    06/22/22 1030   BP: 138/68   Pulse: 100   Resp: 19   Temp: 97.1 °F (36.2 °C)   TempSrc: Temporal   SpO2: 98%   Weight: 184 lb 6.4 oz (83.6 kg)   Height: 5' 6\" (1.676 m)        Body mass index is 29.76 kg/m². Orders Placed This Encounter   Procedures    EKG 12 lead     Standing Status:   Future     Number of Occurrences:   1     Standing Expiration Date:   8/21/2022     Order Specific Question:   Reason for Exam?     Answer: Other        EXAM   Physical Exam  Vitals and nursing note reviewed. Constitutional:       Appearance: Normal appearance. He is well-developed. Comments: overweight   HENT:      Right Ear: Tympanic membrane, ear canal and external ear normal.      Left Ear: Tympanic membrane, ear canal and external ear normal.      Nose: Nose normal.      Mouth/Throat:      Pharynx: Oropharynx is clear. No posterior oropharyngeal erythema. Eyes:      Conjunctiva/sclera: Conjunctivae normal.      Pupils: Pupils are equal, round, and reactive to light. Neck:      Thyroid: No thyroid mass or thyromegaly. Vascular: No carotid bruit or JVD. Trachea: Trachea normal.   Cardiovascular:      Rate and Rhythm: Regular rhythm. Tachycardia present. Heart sounds: Normal heart sounds. No murmur heard. No gallop. Pulmonary:      Effort: Pulmonary effort is normal.      Breath sounds: Normal breath sounds. No wheezing, rhonchi or rales. Abdominal:      General: Bowel sounds are normal. There is no distension. Palpations: Abdomen is soft. There is no mass. Tenderness: There is no abdominal tenderness. There is no guarding. Hernia: No hernia is present. Musculoskeletal:         General: No swelling or tenderness. Normal range of motion. Cervical back: Neck supple. No tenderness. Right lower leg: No edema. Left lower leg: No edema. Lymphadenopathy:      Cervical: No cervical adenopathy. Skin:     General: Skin is warm and dry. Coloration: Skin is not jaundiced. Findings: No bruising or rash. Neurological:      General: No focal deficit present. Mental Status: He is alert and oriented to person, place, and time. Motor: No abnormal muscle tone. Psychiatric:         Mood and Affect: Mood normal.         Behavior: Behavior normal.           Ponce Mandujano was seen today for medication refill. Diagnoses and all orders for this visit:    Coronary artery disease involving native coronary artery of native heart without angina pectoris  -     simvastatin (ZOCOR) 40 MG tablet; Take 1 tablet by mouth nightly  -     metoprolol tartrate (LOPRESSOR) 25 MG tablet; Take 1 tablet by mouth 2 times daily  -     EKG 12 lead; Future  -     EKG 12 lead  Denies ischemic symptoms  Benign prostatic hyperplasia with urinary frequency  -     tamsulosin (FLOMAX) 0.4 MG capsule; Take 1 capsule by mouth 2 times daily    Other hyperlipidemia  -     simvastatin (ZOCOR) 40 MG tablet; Take 1 tablet by mouth nightly  -     metoprolol tartrate (LOPRESSOR) 25 MG tablet; Take 1 tablet by mouth 2 times daily    Quite extraordinary that he still works, seems fit for age      No follow-ups on file.     Electronically signed by Chiquita Gomez MD on 6/22/22 at 11:49 AM EDT

## 2022-12-13 ENCOUNTER — OFFICE VISIT (OUTPATIENT)
Dept: FAMILY MEDICINE CLINIC | Age: 87
End: 2022-12-13
Payer: MEDICARE

## 2022-12-13 VITALS
HEIGHT: 66 IN | TEMPERATURE: 98.2 F | WEIGHT: 192 LBS | HEART RATE: 74 BPM | OXYGEN SATURATION: 95 % | RESPIRATION RATE: 16 BRPM | BODY MASS INDEX: 30.86 KG/M2 | DIASTOLIC BLOOD PRESSURE: 60 MMHG | SYSTOLIC BLOOD PRESSURE: 136 MMHG

## 2022-12-13 DIAGNOSIS — J01.40 ACUTE NON-RECURRENT PANSINUSITIS: Primary | ICD-10-CM

## 2022-12-13 PROCEDURE — G8484 FLU IMMUNIZE NO ADMIN: HCPCS | Performed by: FAMILY MEDICINE

## 2022-12-13 PROCEDURE — 1036F TOBACCO NON-USER: CPT | Performed by: FAMILY MEDICINE

## 2022-12-13 PROCEDURE — G8417 CALC BMI ABV UP PARAM F/U: HCPCS | Performed by: FAMILY MEDICINE

## 2022-12-13 PROCEDURE — 99213 OFFICE O/P EST LOW 20 MIN: CPT | Performed by: FAMILY MEDICINE

## 2022-12-13 PROCEDURE — G8428 CUR MEDS NOT DOCUMENT: HCPCS | Performed by: FAMILY MEDICINE

## 2022-12-13 PROCEDURE — 1123F ACP DISCUSS/DSCN MKR DOCD: CPT | Performed by: FAMILY MEDICINE

## 2022-12-13 RX ORDER — AZITHROMYCIN 250 MG/1
250 TABLET, FILM COATED ORAL SEE ADMIN INSTRUCTIONS
Qty: 6 TABLET | Refills: 0 | Status: SHIPPED | OUTPATIENT
Start: 2022-12-13 | End: 2022-12-18

## 2022-12-13 RX ORDER — PREDNISONE 10 MG/1
10 TABLET ORAL 2 TIMES DAILY
Qty: 10 TABLET | Refills: 0 | Status: SHIPPED | OUTPATIENT
Start: 2022-12-13 | End: 2022-12-18

## 2022-12-13 ASSESSMENT — ENCOUNTER SYMPTOMS
CHEST TIGHTNESS: 0
ALLERGIC/IMMUNOLOGIC NEGATIVE: 1
EYE REDNESS: 0
VOMITING: 0
SINUS PAIN: 0
BLOOD IN STOOL: 0
BACK PAIN: 0
COUGH: 1
ABDOMINAL PAIN: 0
PHOTOPHOBIA: 0
DIARRHEA: 0
NAUSEA: 0
SHORTNESS OF BREATH: 0
TROUBLE SWALLOWING: 0
EYE PAIN: 0
SORE THROAT: 0
EYE DISCHARGE: 0

## 2022-12-13 NOTE — PROGRESS NOTES
22  Uzair Shell : 1935 Sex: male  Age: 81 y.o. Assessment and Plan:  Yvonne Martinez was seen today for cough, congestion and chest congestion. Diagnoses and all orders for this visit:    Acute non-recurrent pansinusitis  -     azithromycin (ZITHROMAX) 250 MG tablet; Take 1 tablet by mouth See Admin Instructions for 5 days 500mg on day 1 followed by 250mg on days 2 - 5  -     predniSONE (DELTASONE) 10 MG tablet; Take 1 tablet by mouth 2 times daily for 5 days    We will treat the sinusitis with a Z-Erwin and a prednisone burst.  Symptomatic treatment can include Tylenol, fluids, rest, Mucinex, Claritin, coolmist.  His complaints not improve, or worsen, follow-up office or emergency room. Return 3 to 5 days recheck if not improved. Chief Complaint   Patient presents with    Cough    Congestion    Chest Congestion     X 1 week        Congestion, pressure, drainage, facial tenderness, mild headache, cough, onset 7 days ago. Denies fever, chills, diaphoresis, nausea, vomiting, decreased oral intake. Denies other GI or  complaints. OTC treatments minimally effective. Review of Systems   Constitutional:  Negative for appetite change, fatigue and unexpected weight change. HENT:  Positive for congestion and postnasal drip. Negative for ear pain, hearing loss, sinus pain, sore throat and trouble swallowing. Eyes:  Negative for photophobia, pain, discharge and redness. Respiratory:  Positive for cough. Negative for chest tightness and shortness of breath. Cardiovascular:  Negative for chest pain, palpitations and leg swelling. Gastrointestinal:  Negative for abdominal pain, blood in stool, diarrhea, nausea and vomiting. Endocrine: Negative. Genitourinary:  Negative for dysuria, flank pain, frequency and hematuria. Musculoskeletal:  Negative for arthralgias, back pain, joint swelling and myalgias. Skin: Negative. Allergic/Immunologic: Negative.     Neurological:  Negative for dizziness, seizures, syncope, weakness, light-headedness, numbness and headaches. Hematological:  Negative for adenopathy. Does not bruise/bleed easily. Psychiatric/Behavioral: Negative.          Current Outpatient Medications:     azithromycin (ZITHROMAX) 250 MG tablet, Take 1 tablet by mouth See Admin Instructions for 5 days 500mg on day 1 followed by 250mg on days 2 - 5, Disp: 6 tablet, Rfl: 0    predniSONE (DELTASONE) 10 MG tablet, Take 1 tablet by mouth 2 times daily for 5 days, Disp: 10 tablet, Rfl: 0    tamsulosin (FLOMAX) 0.4 MG capsule, Take 1 capsule by mouth 2 times daily, Disp: 180 capsule, Rfl: 1    simvastatin (ZOCOR) 40 MG tablet, Take 1 tablet by mouth nightly, Disp: 90 tablet, Rfl: 1    metoprolol tartrate (LOPRESSOR) 25 MG tablet, Take 1 tablet by mouth 2 times daily, Disp: 180 tablet, Rfl: 1    nitroGLYCERIN (NITROSTAT) 0.4 MG SL tablet, DISSOLVE 1 TABLET UNDER THE TONGUE FOR CHEST PAIN FOR A MAX OF 3 TABS 10 MINS APART BEFORE GOING TO THE ER FOR EVALUATION, Disp: 25 tablet, Rfl: 0    aspirin 81 MG tablet, Take 1 tablet by mouth daily Only taking once every 2 months, Disp: , Rfl:   No Known Allergies    Past Medical History:   Diagnosis Date    Amputation, finger, traumatic     3,4,5 from bandsaw; successful repair thumb and index finger    Aneurysm, abdominal aortic     2.2 cm 2/2007    CAD (coronary artery disease) 04/2011    5 vessel CABG, emergent [facility] cardiac arrest in ER    Cervicobrachial syndrome     Hyperlipidemia     Peyronie's disease     Radiculopathy, lumbar region     Renal cyst, left      Past Surgical History:   Procedure Laterality Date    COLONOSCOPY  02/26/2010    insignificant polyp; [facility]. Alla Hendricks, [facility]    CORONARY ARTERY BYPASS GRAFT  04/2011    5 vessel    OTHER SURGICAL HISTORY      repair thumb and index finger following accident with band saw     Family History   Problem Relation Age of Onset    Diabetes Mother     No Known Problems Father Breast Cancer Sister     Other Brother         blood cancer    Lung Cancer Brother     Cancer Brother         unknown type    Other Sister         cerebral aneurysm, large benign duodenal polyp    No Known Problems Sister     No Known Problems Sister     No Known Problems Sister      Social History     Socioeconomic History    Marital status:      Spouse name: Not on file    Number of children: Not on file    Years of education: Not on file    Highest education level: Not on file   Occupational History    Occupation: still working [address]: works [employer]   Tobacco Use    Smoking status: Former     Types: Cigars     Quit date:      Years since quittin.0    Smokeless tobacco: Never    Tobacco comments:     very few   Vaping Use    Vaping Use: Never used   Substance and Sexual Activity    Alcohol use: Not Currently     Comment: rarely    Drug use: Never    Sexual activity: Not Currently     Partners: Female   Other Topics Concern    Not on file   Social History Narrative    Not on file     Social Determinants of Health     Financial Resource Strain: Low Risk     Difficulty of Paying Living Expenses: Not hard at all   Food Insecurity: No Food Insecurity    Worried About [address] [place] [address] [employer] in the Last Year: Never true    Mesha of Food in the Last Year: Never true   Transportation Needs: Not on file   Physical Activity: Not on file   Stress: Not on file   Social Connections: Not on file   Intimate Partner Violence: Not on file   Housing Stability: Not on file       Vitals:    22 1318   BP: 136/60   Pulse: 74   Resp: 16   Temp: 98.2 °F (36.8 °C)   TempSrc: Temporal   SpO2: 95%   Weight: 192 lb (87.1 kg)   Height: 5' 6\" (1.676 m)       Physical Exam  Vitals and nursing note reviewed. Constitutional:       Appearance: He is well-developed. HENT:      Head: Atraumatic.       Right Ear: External ear normal.      Left Ear: External ear normal.      Nose: Congestion present. Mouth/Throat:      Pharynx: Posterior oropharyngeal erythema present. No oropharyngeal exudate. Eyes:      Conjunctiva/sclera: Conjunctivae normal.      Pupils: Pupils are equal, round, and reactive to light. Neck:      Thyroid: No thyromegaly. Trachea: No tracheal deviation. Cardiovascular:      Rate and Rhythm: Normal rate and regular rhythm. Heart sounds: No murmur heard. No friction rub. No gallop. Pulmonary:      Effort: Pulmonary effort is normal. No respiratory distress. Breath sounds: Normal breath sounds. Abdominal:      General: Bowel sounds are normal.      Palpations: Abdomen is soft. Musculoskeletal:         General: No tenderness or deformity. Normal range of motion. Cervical back: Normal range of motion and neck supple. Lymphadenopathy:      Cervical: No cervical adenopathy. Skin:     General: Skin is warm and dry. Capillary Refill: Capillary refill takes less than 2 seconds. Findings: No rash. Neurological:      Mental Status: He is alert and oriented to person, place, and time. Sensory: No sensory deficit. Motor: No abnormal muscle tone.       Coordination: Coordination normal.      Deep Tendon Reflexes: Reflexes normal.           Seen By:  Ilir Starr DO

## 2022-12-22 ENCOUNTER — OFFICE VISIT (OUTPATIENT)
Dept: FAMILY MEDICINE CLINIC | Age: 87
End: 2022-12-22

## 2022-12-22 VITALS
RESPIRATION RATE: 17 BRPM | WEIGHT: 193 LBS | HEART RATE: 86 BPM | HEIGHT: 66 IN | SYSTOLIC BLOOD PRESSURE: 132 MMHG | BODY MASS INDEX: 31.02 KG/M2 | OXYGEN SATURATION: 98 % | TEMPERATURE: 97.4 F | DIASTOLIC BLOOD PRESSURE: 78 MMHG

## 2022-12-22 DIAGNOSIS — B96.89 ACUTE BACTERIAL SINUSITIS: Primary | ICD-10-CM

## 2022-12-22 DIAGNOSIS — E78.49 OTHER HYPERLIPIDEMIA: ICD-10-CM

## 2022-12-22 DIAGNOSIS — N40.1 BENIGN PROSTATIC HYPERPLASIA WITH URINARY FREQUENCY: ICD-10-CM

## 2022-12-22 DIAGNOSIS — R35.0 BENIGN PROSTATIC HYPERPLASIA WITH URINARY FREQUENCY: ICD-10-CM

## 2022-12-22 DIAGNOSIS — J01.90 ACUTE BACTERIAL SINUSITIS: Primary | ICD-10-CM

## 2022-12-22 DIAGNOSIS — I25.10 CORONARY ARTERY DISEASE INVOLVING NATIVE CORONARY ARTERY OF NATIVE HEART WITHOUT ANGINA PECTORIS: ICD-10-CM

## 2022-12-22 RX ORDER — METHYLPREDNISOLONE ACETATE 80 MG/ML
40 INJECTION, SUSPENSION INTRA-ARTICULAR; INTRALESIONAL; INTRAMUSCULAR; SOFT TISSUE ONCE
Status: COMPLETED | OUTPATIENT
Start: 2022-12-22 | End: 2022-12-22

## 2022-12-22 RX ORDER — NITROGLYCERIN 0.4 MG/1
TABLET SUBLINGUAL
Qty: 25 TABLET | Refills: 0 | Status: SHIPPED | OUTPATIENT
Start: 2022-12-22

## 2022-12-22 RX ORDER — TAMSULOSIN HYDROCHLORIDE 0.4 MG/1
0.4 CAPSULE ORAL 2 TIMES DAILY
Qty: 180 CAPSULE | Refills: 1 | Status: SHIPPED | OUTPATIENT
Start: 2022-12-22

## 2022-12-22 RX ORDER — DOXYCYCLINE HYCLATE 100 MG
100 TABLET ORAL 2 TIMES DAILY
Qty: 20 TABLET | Refills: 0 | Status: SHIPPED
Start: 2022-12-22 | End: 2022-12-24 | Stop reason: SDUPTHER

## 2022-12-22 RX ORDER — GUAIFENESIN 600 MG/1
600 TABLET, EXTENDED RELEASE ORAL 2 TIMES DAILY
Qty: 30 TABLET | Refills: 0 | Status: SHIPPED
Start: 2022-12-22 | End: 2022-12-24 | Stop reason: SDUPTHER

## 2022-12-22 RX ORDER — SIMVASTATIN 40 MG
40 TABLET ORAL NIGHTLY
Qty: 90 TABLET | Refills: 1 | Status: SHIPPED | OUTPATIENT
Start: 2022-12-22

## 2022-12-22 RX ADMIN — METHYLPREDNISOLONE ACETATE 40 MG: 80 INJECTION, SUSPENSION INTRA-ARTICULAR; INTRALESIONAL; INTRAMUSCULAR; SOFT TISSUE at 11:08

## 2022-12-22 ASSESSMENT — ENCOUNTER SYMPTOMS
DIARRHEA: 0
PHOTOPHOBIA: 0
BACK PAIN: 0
ABDOMINAL PAIN: 0
COUGH: 1
SINUS PRESSURE: 1
SINUS PAIN: 0
SORE THROAT: 1
VOMITING: 0
BLOOD IN STOOL: 0
CONSTIPATION: 0
EYE REDNESS: 0
SHORTNESS OF BREATH: 0
WHEEZING: 0
TROUBLE SWALLOWING: 0

## 2022-12-22 NOTE — PROGRESS NOTES
OFFICE NOTE    12/22/22  Name: Ulices Engel  VEM:2[birth year]11   Sex:male   Age:87 y.o. SUBJECTIVE  Chief Complaint   Patient presents with    Cough     X 2 wks    Nasal Congestion     X 2 wks        HPI Comes in for checkup and refills. Finally retired from [employer] which is being sold off by children. Review of Systems   Constitutional:  Positive for activity change and fatigue. Negative for appetite change, fever and unexpected weight change. HENT:  Positive for congestion, postnasal drip, sinus pressure and sore throat. Negative for ear pain, hearing loss, sinus pain and trouble swallowing. Eyes:  Negative for photophobia, redness and visual disturbance. Respiratory:  Positive for cough. Negative for shortness of breath and wheezing. Cardiovascular:  Negative for chest pain, palpitations and leg swelling. Gastrointestinal:  Negative for abdominal pain, blood in stool, constipation, diarrhea and vomiting. Endocrine: Negative for cold intolerance, polydipsia and polyuria. Genitourinary:  Positive for frequency and urgency. Negative for difficulty urinating, genital sores and hematuria. Musculoskeletal:  Positive for arthralgias. Negative for back pain and joint swelling. Skin:  Negative for pallor and rash. Allergic/Immunologic: Negative for food allergies. Neurological:  Negative for dizziness, tremors, seizures, syncope, numbness and headaches. Hematological:  Negative for adenopathy. Does not bruise/bleed easily. Psychiatric/Behavioral:  Negative for agitation, dysphoric mood, hallucinations and sleep disturbance. The patient is nervous/anxious.            Current Outpatient Medications:     nitroGLYCERIN (NITROSTAT) 0.4 MG SL tablet, DISSOLVE 1 TABLET UNDER THE TONGUE FOR CHEST PAIN FOR A MAX OF 3 TABS 10 MINS APART BEFORE GOING TO THE ER FOR EVALUATION, Disp: 25 tablet, Rfl: 0    metoprolol tartrate (LOPRESSOR) 25 MG tablet, Take 1 tablet by mouth 2 times daily, Disp: 180 tablet, Rfl: 1    simvastatin (ZOCOR) 40 MG tablet, Take 1 tablet by mouth nightly, Disp: 90 tablet, Rfl: 1    tamsulosin (FLOMAX) 0.4 MG capsule, Take 1 capsule by mouth 2 times daily, Disp: 180 capsule, Rfl: 1    doxycycline hyclate (VIBRA-TABS) 100 MG tablet, Take 1 tablet by mouth 2 times daily for 10 days, Disp: 20 tablet, Rfl: 0    guaiFENesin (MUCINEX) 600 MG extended release tablet, Take 1 tablet by mouth 2 times daily for 15 days, Disp: 30 tablet, Rfl: 0    aspirin 81 MG tablet, Take 1 tablet by mouth daily Only taking once every 2 months, Disp: , Rfl:   No Known Allergies    Past Medical History:   Diagnosis Date    Amputation, finger, traumatic     3,4,5 from bandsaw; successful repair thumb and index finger    Aneurysm, abdominal aortic     2.2 cm 2/2007    CAD (coronary artery disease) 04/2011    5 vessel CABG, emergent [employer] aftr cardiac arrest in ER    Cervicobrachial syndrome     Hyperlipidemia     Peyronie's disease     Radiculopathy, lumbar region     Renal cyst, left      Past Surgical History:   Procedure Laterality Date    COLONOSCOPY  02/26/2010    insignificant polyp; Dr. Chris Steve [facility]    CORONARY ARTERY BYPASS GRAFT  04/2011    5 vessel    OTHER SURGICAL HISTORY      repair thumb and index finger following accident with band saw     Family History   Problem Relation Age of Onset    Diabetes Mother     No Known Problems Father     Breast Cancer Sister     Other Brother         blood cancer    Lung Cancer Brother     Cancer Brother         unknown type    Other Sister         cerebral aneurysm, large benign duodenal polyp    No Known Problems Sister     No Known Problems Sister     No Known Problems Sister      Social History       Tobacco History       Smoking Status  Former Quit Date  1946 Smoking Tobacco Type  Cigars quit in 1946      Smokeless Tobacco Use  Never      Tobacco Comments  very few              Alcohol History       Alcohol Use Status  Not Currently Comment  rarely Drug Use       Drug Use Status  Never              Sexual Activity       Sexually Active  Not Currently Partners  Female                    OBJECTIVE  Vitals:    12/22/22 0948 12/22/22 0954 12/22/22 1020   BP: (!) 150/82 (!) 150/80 132/78   Pulse: 86     Resp: 17     Temp: 97.4 °F (36.3 °C)     TempSrc: Temporal     SpO2: 98%     Weight: 193 lb (87.5 kg)     Height: 5' 6\" (1.676 m)          Body mass index is 31.15 kg/m². No orders of the defined types were placed in this encounter. EXAM   Physical Exam  Vitals and nursing note reviewed. Constitutional:       Appearance: Normal appearance. Comments: overweight   HENT:      Right Ear: Tympanic membrane and external ear normal.      Left Ear: Tympanic membrane and external ear normal.      Nose: Congestion and rhinorrhea present. Mouth/Throat:      Pharynx: Oropharynx is clear. Posterior oropharyngeal erythema present. Comments: dentures  Eyes:      General: No scleral icterus. Conjunctiva/sclera: Conjunctivae normal.   Cardiovascular:      Rate and Rhythm: Normal rate and regular rhythm. Heart sounds: No murmur heard. Pulmonary:      Effort: Pulmonary effort is normal.      Breath sounds: Rhonchi present. No wheezing or rales. Abdominal:      General: Bowel sounds are normal.      Palpations: There is no mass. Tenderness: There is no abdominal tenderness. Musculoskeletal:         General: No swelling or tenderness. Cervical back: No tenderness. Right lower leg: No edema. Left lower leg: No edema. Lymphadenopathy:      Cervical: No cervical adenopathy. Skin:     Coloration: Skin is not jaundiced or pale. Findings: Lesion present. No bruising or rash. Comments: Several AK's pinnae and preauricular both sides   Neurological:      General: No focal deficit present. Mental Status: He is alert and oriented to person, place, and time.    Psychiatric:         Mood and Affect: Mood normal.         Behavior: Behavior normal.         Casey Garvin was seen today for cough and nasal congestion. Diagnoses and all orders for this visit:    Acute bacterial sinusitis    Coronary artery disease involving native coronary artery of native heart without angina pectoris  -     nitroGLYCERIN (NITROSTAT) 0.4 MG SL tablet; DISSOLVE 1 TABLET UNDER THE TONGUE FOR CHEST PAIN FOR A MAX OF 3 TABS 10 MINS APART BEFORE GOING TO THE ER FOR EVALUATION  -     metoprolol tartrate (LOPRESSOR) 25 MG tablet; Take 1 tablet by mouth 2 times daily  -     simvastatin (ZOCOR) 40 MG tablet; Take 1 tablet by mouth nightly    Other hyperlipidemia  -     metoprolol tartrate (LOPRESSOR) 25 MG tablet; Take 1 tablet by mouth 2 times daily  -     simvastatin (ZOCOR) 40 MG tablet; Take 1 tablet by mouth nightly    Benign prostatic hyperplasia with urinary frequency  -     tamsulosin (FLOMAX) 0.4 MG capsule; Take 1 capsule by mouth 2 times daily    Other orders  -     doxycycline hyclate (VIBRA-TABS) 100 MG tablet; Take 1 tablet by mouth 2 times daily for 10 days  -     guaiFENesin (MUCINEX) 600 MG extended release tablet; Take 1 tablet by mouth 2 times daily for 15 days  -     methylPREDNISolone acetate (DEPO-MEDROL) injection 40 mg    BP felt to be ok. Slept poorly didn not take hs metoprolol and had alkaseltzer plus last night and this am. The latter was discouraged    No follow-ups on file.     Electronically signed by Andrew Johnson MD on 12/22/22 at 10:33 AM EST

## 2022-12-23 ENCOUNTER — TELEPHONE (OUTPATIENT)
Dept: FAMILY MEDICINE CLINIC | Age: 87
End: 2022-12-23

## 2022-12-23 DIAGNOSIS — J40 BRONCHITIS: Primary | ICD-10-CM

## 2022-12-23 NOTE — TELEPHONE ENCOUNTER
Doxycycline and mucinex sent to [facility] by mistake. Rxs cancelled at mail order and called into [facility] in [place]. Can you sign off on the called in Rxs?     Thanks

## 2022-12-24 RX ORDER — GUAIFENESIN 600 MG/1
600 TABLET, EXTENDED RELEASE ORAL 2 TIMES DAILY
Qty: 30 TABLET | Refills: 0 | OUTPATIENT
Start: 2022-12-24 | End: 2023-01-08

## 2022-12-24 RX ORDER — DOXYCYCLINE HYCLATE 100 MG
100 TABLET ORAL 2 TIMES DAILY
Qty: 20 TABLET | Refills: 0 | OUTPATIENT
Start: 2022-12-24 | End: 2023-01-03

## 2023-06-27 ENCOUNTER — OFFICE VISIT (OUTPATIENT)
Dept: FAMILY MEDICINE CLINIC | Age: 88
End: 2023-06-27
Payer: MEDICARE

## 2023-06-27 VITALS
SYSTOLIC BLOOD PRESSURE: 138 MMHG | DIASTOLIC BLOOD PRESSURE: 70 MMHG | RESPIRATION RATE: 18 BRPM | BODY MASS INDEX: 30.28 KG/M2 | TEMPERATURE: 97 F | HEIGHT: 66 IN | HEART RATE: 74 BPM | WEIGHT: 188.4 LBS | OXYGEN SATURATION: 98 %

## 2023-06-27 DIAGNOSIS — I25.10 CORONARY ARTERY DISEASE INVOLVING NATIVE CORONARY ARTERY OF NATIVE HEART WITHOUT ANGINA PECTORIS: ICD-10-CM

## 2023-06-27 DIAGNOSIS — R35.0 BENIGN PROSTATIC HYPERPLASIA WITH URINARY FREQUENCY: ICD-10-CM

## 2023-06-27 DIAGNOSIS — Z12.5 SCREENING FOR PROSTATE CANCER: ICD-10-CM

## 2023-06-27 DIAGNOSIS — I25.119 ATHEROSCLEROSIS OF NATIVE CORONARY ARTERY OF NATIVE HEART WITH ANGINA PECTORIS (HCC): ICD-10-CM

## 2023-06-27 DIAGNOSIS — N40.1 BENIGN PROSTATIC HYPERPLASIA WITH URINARY FREQUENCY: ICD-10-CM

## 2023-06-27 DIAGNOSIS — E78.49 OTHER HYPERLIPIDEMIA: ICD-10-CM

## 2023-06-27 DIAGNOSIS — I20.9 ANGINA PECTORIS, UNSPECIFIED (HCC): ICD-10-CM

## 2023-06-27 DIAGNOSIS — E78.49 OTHER HYPERLIPIDEMIA: Primary | ICD-10-CM

## 2023-06-27 LAB
BACTERIA URNS QL MICRO: NORMAL /HPF
BASOPHILS # BLD: 0.08 E9/L (ref 0–0.2)
BASOPHILS NFR BLD: 1.4 % (ref 0–2)
BILIRUB UR QL STRIP: NEGATIVE
CLARITY UR: CLEAR
COLOR UR: YELLOW
EOSINOPHIL # BLD: 0.17 E9/L (ref 0.05–0.5)
EOSINOPHIL NFR BLD: 3 % (ref 0–6)
ERYTHROCYTE [DISTWIDTH] IN BLOOD BY AUTOMATED COUNT: 14.4 FL (ref 11.5–15)
GLUCOSE UR STRIP-MCNC: NEGATIVE MG/DL
HCT VFR BLD AUTO: 40.7 % (ref 37–54)
HGB BLD-MCNC: 12.7 G/DL (ref 12.5–16.5)
HGB UR QL STRIP: ABNORMAL
IMM GRANULOCYTES # BLD: 0.06 E9/L
IMM GRANULOCYTES NFR BLD: 1.1 % (ref 0–5)
KETONES UR STRIP-MCNC: NEGATIVE MG/DL
LEUKOCYTE ESTERASE UR QL STRIP: NEGATIVE
LYMPHOCYTES # BLD: 0.99 E9/L (ref 1.5–4)
LYMPHOCYTES NFR BLD: 17.5 % (ref 20–42)
MCH RBC QN AUTO: 28.9 PG (ref 26–35)
MCHC RBC AUTO-ENTMCNC: 31.2 % (ref 32–34.5)
MCV RBC AUTO: 92.5 FL (ref 80–99.9)
MONOCYTES # BLD: 0.76 E9/L (ref 0.1–0.95)
MONOCYTES NFR BLD: 13.4 % (ref 2–12)
NEUTROPHILS # BLD: 3.61 E9/L (ref 1.8–7.3)
NEUTS SEG NFR BLD: 63.6 % (ref 43–80)
NITRITE UR QL STRIP: NEGATIVE
PH UR STRIP: 6 [PH] (ref 5–9)
PLATELET # BLD AUTO: 201 E9/L (ref 130–450)
PMV BLD AUTO: 10.9 FL (ref 7–12)
PROT UR STRIP-MCNC: NEGATIVE MG/DL
RBC # BLD AUTO: 4.4 E12/L (ref 3.8–5.8)
RBC #/AREA URNS HPF: NORMAL /HPF (ref 0–2)
SP GR UR STRIP: 1.01 (ref 1–1.03)
UROBILINOGEN UR STRIP-ACNC: 0.2 E.U./DL
WBC # BLD: 5.7 E9/L (ref 4.5–11.5)
WBC #/AREA URNS HPF: NORMAL /HPF (ref 0–5)

## 2023-06-27 PROCEDURE — 93000 ELECTROCARDIOGRAM COMPLETE: CPT | Performed by: FAMILY MEDICINE

## 2023-06-27 PROCEDURE — 1036F TOBACCO NON-USER: CPT | Performed by: FAMILY MEDICINE

## 2023-06-27 PROCEDURE — G8427 DOCREV CUR MEDS BY ELIG CLIN: HCPCS | Performed by: FAMILY MEDICINE

## 2023-06-27 PROCEDURE — 1123F ACP DISCUSS/DSCN MKR DOCD: CPT | Performed by: FAMILY MEDICINE

## 2023-06-27 PROCEDURE — 99214 OFFICE O/P EST MOD 30 MIN: CPT | Performed by: FAMILY MEDICINE

## 2023-06-27 PROCEDURE — G8417 CALC BMI ABV UP PARAM F/U: HCPCS | Performed by: FAMILY MEDICINE

## 2023-06-27 RX ORDER — TAMSULOSIN HYDROCHLORIDE 0.4 MG/1
0.4 CAPSULE ORAL 2 TIMES DAILY
Qty: 180 CAPSULE | Refills: 1 | Status: SHIPPED | OUTPATIENT
Start: 2023-06-27

## 2023-06-27 RX ORDER — SIMVASTATIN 40 MG
40 TABLET ORAL NIGHTLY
Qty: 90 TABLET | Refills: 1 | Status: SHIPPED | OUTPATIENT
Start: 2023-06-27

## 2023-06-27 SDOH — ECONOMIC STABILITY: HOUSING INSECURITY
IN THE LAST 12 MONTHS, WAS THERE A TIME WHEN YOU DID NOT HAVE A STEADY PLACE TO SLEEP OR SLEPT IN A SHELTER (INCLUDING NOW)?: NO

## 2023-06-27 SDOH — ECONOMIC STABILITY: FOOD INSECURITY: WITHIN THE PAST 12 MONTHS, THE FOOD YOU BOUGHT JUST DIDN'T LAST AND YOU DIDN'T HAVE MONEY TO GET MORE.: NEVER TRUE

## 2023-06-27 SDOH — ECONOMIC STABILITY: FOOD INSECURITY: WITHIN THE PAST 12 MONTHS, YOU WORRIED THAT YOUR FOOD WOULD RUN OUT BEFORE YOU GOT MONEY TO BUY MORE.: NEVER TRUE

## 2023-06-27 SDOH — ECONOMIC STABILITY: INCOME INSECURITY: HOW HARD IS IT FOR YOU TO PAY FOR THE VERY BASICS LIKE FOOD, HOUSING, MEDICAL CARE, AND HEATING?: NOT HARD AT ALL

## 2023-06-27 ASSESSMENT — PATIENT HEALTH QUESTIONNAIRE - PHQ9
SUM OF ALL RESPONSES TO PHQ QUESTIONS 1-9: 0
SUM OF ALL RESPONSES TO PHQ9 QUESTIONS 1 & 2: 0
1. LITTLE INTEREST OR PLEASURE IN DOING THINGS: 0
SUM OF ALL RESPONSES TO PHQ QUESTIONS 1-9: 0
SUM OF ALL RESPONSES TO PHQ QUESTIONS 1-9: 0
2. FEELING DOWN, DEPRESSED OR HOPELESS: 0
SUM OF ALL RESPONSES TO PHQ QUESTIONS 1-9: 0

## 2023-06-27 ASSESSMENT — LIFESTYLE VARIABLES
HOW MANY STANDARD DRINKS CONTAINING ALCOHOL DO YOU HAVE ON A TYPICAL DAY: PATIENT DOES NOT DRINK
HOW OFTEN DO YOU HAVE A DRINK CONTAINING ALCOHOL: NEVER

## 2023-06-28 LAB
ALBUMIN SERPL-MCNC: 4.2 G/DL (ref 3.5–5.2)
ALP SERPL-CCNC: 99 U/L (ref 40–129)
ALT SERPL-CCNC: 11 U/L (ref 0–40)
ANION GAP SERPL CALCULATED.3IONS-SCNC: 10 MMOL/L (ref 7–16)
AST SERPL-CCNC: 20 U/L (ref 0–39)
BILIRUB SERPL-MCNC: 0.4 MG/DL (ref 0–1.2)
BUN SERPL-MCNC: 18 MG/DL (ref 6–23)
CALCIUM SERPL-MCNC: 9.4 MG/DL (ref 8.6–10.2)
CHLORIDE SERPL-SCNC: 105 MMOL/L (ref 98–107)
CHOLESTEROL, TOTAL: 166 MG/DL (ref 0–199)
CO2 SERPL-SCNC: 26 MMOL/L (ref 22–29)
CREAT SERPL-MCNC: 1.1 MG/DL (ref 0.7–1.2)
GLUCOSE SERPL-MCNC: 95 MG/DL (ref 74–99)
HDLC SERPL-MCNC: 45 MG/DL
LDLC SERPL CALC-MCNC: 99 MG/DL (ref 0–99)
POTASSIUM SERPL-SCNC: 4.7 MMOL/L (ref 3.5–5)
PROT SERPL-MCNC: 7 G/DL (ref 6.4–8.3)
PSA SERPL-MCNC: 0.74 NG/ML (ref 0–4)
SODIUM SERPL-SCNC: 141 MMOL/L (ref 132–146)
TRIGL SERPL-MCNC: 108 MG/DL (ref 0–149)
TSH SERPL-MCNC: 5.1 UIU/ML (ref 0.27–4.2)
VLDLC SERPL CALC-MCNC: 22 MG/DL

## 2023-06-28 ASSESSMENT — ENCOUNTER SYMPTOMS
ABDOMINAL PAIN: 0
DIARRHEA: 0
VOMITING: 0
TROUBLE SWALLOWING: 0
SHORTNESS OF BREATH: 0
BACK PAIN: 0
SINUS PAIN: 0
SORE THROAT: 0
EYE REDNESS: 0
COUGH: 0
WHEEZING: 0
CONSTIPATION: 0
PHOTOPHOBIA: 0
BLOOD IN STOOL: 0

## 2023-12-29 DIAGNOSIS — I25.10 CORONARY ARTERY DISEASE INVOLVING NATIVE CORONARY ARTERY OF NATIVE HEART WITHOUT ANGINA PECTORIS: ICD-10-CM

## 2023-12-29 DIAGNOSIS — E78.49 OTHER HYPERLIPIDEMIA: ICD-10-CM

## 2023-12-29 DIAGNOSIS — R35.0 BENIGN PROSTATIC HYPERPLASIA WITH URINARY FREQUENCY: ICD-10-CM

## 2023-12-29 DIAGNOSIS — N40.1 BENIGN PROSTATIC HYPERPLASIA WITH URINARY FREQUENCY: ICD-10-CM

## 2023-12-30 RX ORDER — TAMSULOSIN HYDROCHLORIDE 0.4 MG/1
0.4 CAPSULE ORAL 2 TIMES DAILY
Qty: 180 CAPSULE | Refills: 1 | Status: SHIPPED | OUTPATIENT
Start: 2023-12-30

## 2023-12-30 RX ORDER — SIMVASTATIN 40 MG
40 TABLET ORAL NIGHTLY
Qty: 90 TABLET | Refills: 1 | Status: SHIPPED | OUTPATIENT
Start: 2023-12-30

## 2024-01-16 ENCOUNTER — OFFICE VISIT (OUTPATIENT)
Dept: FAMILY MEDICINE CLINIC | Age: 89
End: 2024-01-16
Payer: MEDICARE

## 2024-01-16 VITALS
WEIGHT: 190 LBS | RESPIRATION RATE: 18 BRPM | BODY MASS INDEX: 30.53 KG/M2 | HEIGHT: 66 IN | HEART RATE: 70 BPM | TEMPERATURE: 97.1 F | DIASTOLIC BLOOD PRESSURE: 68 MMHG | SYSTOLIC BLOOD PRESSURE: 120 MMHG | OXYGEN SATURATION: 98 %

## 2024-01-16 DIAGNOSIS — I25.119 ATHEROSCLEROSIS OF NATIVE CORONARY ARTERY OF NATIVE HEART WITH ANGINA PECTORIS (HCC): Primary | ICD-10-CM

## 2024-01-16 DIAGNOSIS — N48.6 PEYRONIE'S DISEASE: ICD-10-CM

## 2024-01-16 DIAGNOSIS — R35.0 BENIGN PROSTATIC HYPERPLASIA WITH URINARY FREQUENCY: ICD-10-CM

## 2024-01-16 DIAGNOSIS — N40.1 BENIGN PROSTATIC HYPERPLASIA WITH URINARY FREQUENCY: ICD-10-CM

## 2024-01-16 DIAGNOSIS — R31.1 BENIGN ESSENTIAL MICROSCOPIC HEMATURIA: ICD-10-CM

## 2024-01-16 PROCEDURE — 99214 OFFICE O/P EST MOD 30 MIN: CPT | Performed by: FAMILY MEDICINE

## 2024-01-16 PROCEDURE — 1123F ACP DISCUSS/DSCN MKR DOCD: CPT | Performed by: FAMILY MEDICINE

## 2024-01-16 PROCEDURE — G8484 FLU IMMUNIZE NO ADMIN: HCPCS | Performed by: FAMILY MEDICINE

## 2024-01-16 PROCEDURE — G8417 CALC BMI ABV UP PARAM F/U: HCPCS | Performed by: FAMILY MEDICINE

## 2024-01-16 PROCEDURE — 1036F TOBACCO NON-USER: CPT | Performed by: FAMILY MEDICINE

## 2024-01-16 PROCEDURE — G8427 DOCREV CUR MEDS BY ELIG CLIN: HCPCS | Performed by: FAMILY MEDICINE

## 2024-01-16 ASSESSMENT — ENCOUNTER SYMPTOMS
EYE REDNESS: 0
CHEST TIGHTNESS: 0
SHORTNESS OF BREATH: 0
WHEEZING: 0
PHOTOPHOBIA: 0
COLOR CHANGE: 0
BLOOD IN STOOL: 0
ABDOMINAL PAIN: 0
RHINORRHEA: 1

## 2024-01-16 ASSESSMENT — PATIENT HEALTH QUESTIONNAIRE - PHQ9
1. LITTLE INTEREST OR PLEASURE IN DOING THINGS: 0
2. FEELING DOWN, DEPRESSED OR HOPELESS: 0
SUM OF ALL RESPONSES TO PHQ9 QUESTIONS 1 & 2: 0
SUM OF ALL RESPONSES TO PHQ QUESTIONS 1-9: 0

## 2024-01-16 ASSESSMENT — LIFESTYLE VARIABLES
HOW OFTEN DO YOU HAVE A DRINK CONTAINING ALCOHOL: NEVER
HOW MANY STANDARD DRINKS CONTAINING ALCOHOL DO YOU HAVE ON A TYPICAL DAY: PATIENT DOES NOT DRINK

## 2024-01-16 NOTE — PROGRESS NOTES
finger    Aneurysm, abdominal aortic (HCC)     2.2 cm 2/2007    CAD (coronary artery disease) 04/2011    5 vessel CABG, emergent [employer] [facility] cardiac arrest in ER    Cervicobrachial syndrome     Hyperlipidemia     Peyronie's disease     Radiculopathy, lumbar region     Renal cyst, left      Past Surgical History:   Procedure Laterality Date    COLONOSCOPY  02/26/2010    insignificant polyp; Dr. Acevedo, [facility]    CORONARY ARTERY BYPASS GRAFT  04/2011    5 vessel    OTHER SURGICAL HISTORY      repair thumb and index finger following accident with band saw     Family History   Problem Relation Age of Onset    Diabetes Mother     No Known Problems Father     Breast Cancer Sister     Other Brother         blood cancer    Lung Cancer Brother     Cancer Brother         unknown type    Other Sister         cerebral aneurysm, large benign duodenal polyp    No Known Problems Sister     No Known Problems Sister     No Known Problems Sister      Social History       Tobacco History       Smoking Status  Former Quit Date  1946 Smoking Tobacco Type  Cigars quit in 1946      Smokeless Tobacco Use  Never      Tobacco Comments  very few              Alcohol History       Alcohol Use Status  Not Currently Comment  rarely              Drug Use       Drug Use Status  Never              Sexual Activity       Sexually Active  Not Currently Partners  Female                    OBJECTIVE  Vitals:    01/16/24 1458   BP: 120/68   Pulse: 70   Resp: 18   Temp: 97.1 °F (36.2 °C)   TempSrc: Temporal   SpO2: 98%   Weight: 86.2 kg (190 lb)   Height: 1.676 m (5' 6\")        Body mass index is 30.67 kg/m².    No orders of the defined types were placed in this encounter.       EXAM   Physical Exam  Vitals and nursing note reviewed.   Constitutional:       Appearance: Normal appearance.      Comments: Overweight.   HENT:      Right Ear: Tympanic membrane and external ear normal.      Left Ear: Tympanic membrane and external ear normal.

## 2024-02-22 ENCOUNTER — OFFICE VISIT (OUTPATIENT)
Dept: FAMILY MEDICINE CLINIC | Age: 89
End: 2024-02-22
Payer: MEDICARE

## 2024-02-22 VITALS
DIASTOLIC BLOOD PRESSURE: 70 MMHG | SYSTOLIC BLOOD PRESSURE: 150 MMHG | BODY MASS INDEX: 30.02 KG/M2 | WEIGHT: 186 LBS | HEART RATE: 80 BPM | TEMPERATURE: 98.8 F | OXYGEN SATURATION: 95 %

## 2024-02-22 DIAGNOSIS — N39.0 URINARY TRACT INFECTION WITHOUT HEMATURIA, SITE UNSPECIFIED: ICD-10-CM

## 2024-02-22 DIAGNOSIS — R35.0 URINARY FREQUENCY: ICD-10-CM

## 2024-02-22 DIAGNOSIS — R30.0 DYSURIA: Primary | ICD-10-CM

## 2024-02-22 LAB
BILIRUBIN, POC: NORMAL
BLOOD URINE, POC: NORMAL
CLARITY, POC: CLEAR
COLOR, POC: YELLOW
GLUCOSE URINE, POC: NORMAL
KETONES, POC: NORMAL
LEUKOCYTE EST, POC: NORMAL
NITRITE, POC: NORMAL
PH, POC: 6
PROTEIN, POC: NORMAL
SPECIFIC GRAVITY, POC: 1.01
UROBILINOGEN, POC: 0.2

## 2024-02-22 PROCEDURE — 1036F TOBACCO NON-USER: CPT | Performed by: EMERGENCY MEDICINE

## 2024-02-22 PROCEDURE — G8427 DOCREV CUR MEDS BY ELIG CLIN: HCPCS | Performed by: EMERGENCY MEDICINE

## 2024-02-22 PROCEDURE — 1123F ACP DISCUSS/DSCN MKR DOCD: CPT | Performed by: EMERGENCY MEDICINE

## 2024-02-22 PROCEDURE — G8484 FLU IMMUNIZE NO ADMIN: HCPCS | Performed by: EMERGENCY MEDICINE

## 2024-02-22 PROCEDURE — 81002 URINALYSIS NONAUTO W/O SCOPE: CPT | Performed by: EMERGENCY MEDICINE

## 2024-02-22 PROCEDURE — G8417 CALC BMI ABV UP PARAM F/U: HCPCS | Performed by: EMERGENCY MEDICINE

## 2024-02-22 PROCEDURE — 99213 OFFICE O/P EST LOW 20 MIN: CPT | Performed by: EMERGENCY MEDICINE

## 2024-02-22 RX ORDER — SULFAMETHOXAZOLE AND TRIMETHOPRIM 800; 160 MG/1; MG/1
1 TABLET ORAL 2 TIMES DAILY
Qty: 14 TABLET | Refills: 0 | Status: SHIPPED | OUTPATIENT
Start: 2024-02-22 | End: 2024-02-29

## 2024-02-22 ASSESSMENT — ENCOUNTER SYMPTOMS
COUGH: 0
SINUS PRESSURE: 0
WHEEZING: 0
VOMITING: 0
EYE REDNESS: 0
DIARRHEA: 0
BACK PAIN: 0
EYE PAIN: 0
SORE THROAT: 0
SHORTNESS OF BREATH: 0
EYE DISCHARGE: 0
NAUSEA: 0
ABDOMINAL PAIN: 0

## 2024-02-22 NOTE — PROGRESS NOTES
Chief Complaint:   Dysuria and Urinary Frequency (Started a week ago)      History of Present Illness   Source of history provided by:  patient.      Billy Martinez is a 88 y.o. old male who has a past medical history of:   Past Medical History:   Diagnosis Date    Amputation, finger, traumatic     3,4,5 from bandsaw; successful repair thumb and index finger    Aneurysm, abdominal aortic (HCC)     2.2 cm 2/2007    CAD (coronary artery disease) 04/2011    5 vessel CABG, emergent [facility] cardiac arrest in ER    Cervicobrachial syndrome     Hyperlipidemia     Peyronie's disease     Radiculopathy, lumbar region     Renal cyst, left     presents to the [facility] care for dysuria.  Pt states the symptoms have progressed over the past 7 days.  Pt states they have had increased frequency, lower abdominal pressure, and occasional nausea.  No flank pain.  Denies any vaginal discharge, vaginal bleeding, vomiting, diarrhea, or lethargy.   No LMP for male patient.      ROS   Review of Systems   Constitutional:  Negative for chills and fever.   HENT:  Negative for ear pain, sinus pressure and sore throat.    Eyes:  Negative for pain, discharge and redness.   Respiratory:  Negative for cough, shortness of breath and wheezing.    Cardiovascular:  Negative for chest pain.   Gastrointestinal:  Negative for abdominal pain, diarrhea, nausea and vomiting.   Genitourinary:  Positive for dysuria and frequency.   Musculoskeletal:  Negative for arthralgias and back pain.   Skin:  Negative for rash and wound.   Neurological:  Negative for weakness and headaches.   Hematological:  Negative for adenopathy.   Psychiatric/Behavioral: Negative.     All other systems reviewed and are negative.       Past Surgical history:   Past Surgical History:   Procedure Laterality Date    COLONOSCOPY  02/26/2010    insignificant polyp; Dr. Acevedo, [facility]    CORONARY ARTERY BYPASS GRAFT  04/2011    5 vessel    OTHER SURGICAL HISTORY      repair thumb

## 2024-02-25 LAB
CULTURE: ABNORMAL
SPECIMEN DESCRIPTION: ABNORMAL

## 2024-03-21 ENCOUNTER — OFFICE VISIT (OUTPATIENT)
Dept: FAMILY MEDICINE CLINIC | Age: 89
End: 2024-03-21
Payer: MEDICARE

## 2024-03-21 VITALS
RESPIRATION RATE: 16 BRPM | HEIGHT: 65 IN | OXYGEN SATURATION: 97 % | BODY MASS INDEX: 30.32 KG/M2 | TEMPERATURE: 98 F | WEIGHT: 182 LBS | SYSTOLIC BLOOD PRESSURE: 134 MMHG | HEART RATE: 91 BPM | DIASTOLIC BLOOD PRESSURE: 80 MMHG

## 2024-03-21 DIAGNOSIS — M70.21 OLECRANON BURSITIS, RIGHT ELBOW: Primary | ICD-10-CM

## 2024-03-21 PROCEDURE — G8484 FLU IMMUNIZE NO ADMIN: HCPCS | Performed by: PHYSICIAN ASSISTANT

## 2024-03-21 PROCEDURE — 1123F ACP DISCUSS/DSCN MKR DOCD: CPT | Performed by: PHYSICIAN ASSISTANT

## 2024-03-21 PROCEDURE — 1036F TOBACCO NON-USER: CPT | Performed by: PHYSICIAN ASSISTANT

## 2024-03-21 PROCEDURE — G8417 CALC BMI ABV UP PARAM F/U: HCPCS | Performed by: PHYSICIAN ASSISTANT

## 2024-03-21 PROCEDURE — G8427 DOCREV CUR MEDS BY ELIG CLIN: HCPCS | Performed by: PHYSICIAN ASSISTANT

## 2024-03-21 PROCEDURE — 99213 OFFICE O/P EST LOW 20 MIN: CPT | Performed by: PHYSICIAN ASSISTANT

## 2024-03-21 NOTE — PROGRESS NOTES
Chief Complaint       Joint Swelling (R Elbow swelling)      History of Present Illness   Source of history provided by:  patient.      Billy Martinez is a 88 y.o. old male presenting to the walk in clinic for evaluation of localized swelling over the right olecranon which has been present for the past week.  Patient reports that he bumped his elbow on a piece of furniture prior to the start of his symptoms.  He denies any known history of bursitis in the past.  Since that time the area has been progressively more swollen.  Reports mild erythema and warmth over the same area.  Denies any pain in the joint itself.  Pain does not radiate. Denies any weakness, paresthesias, swelling, neck pain or injury, HA, hand weakness, fever, chills, nausea, vomiting or associated CP or SOB.     ROS    Unless otherwise stated in this report or unable to obtain because of the patient's clinical or mental status as evidenced by the medical record, this patients's positive and negative responses for Review of Systems, constitutional, psych, eyes, ENT, cardiovascular, respiratory, gastrointestinal, neurological, genitourinary, musculoskeletal, integument systems and systems related to the presenting problem are either stated in the preceding or were not pertinent or were negative for the symptoms and/or complaints related to the medical problem.    Past Medical History:  has a past medical history of Amputation, finger, traumatic, Aneurysm, abdominal aortic (HCC), CAD (coronary artery disease), Cervicobrachial syndrome, Hyperlipidemia, Peyronie's disease, Radiculopathy, lumbar region, and Renal cyst, left.  Past Surgical History:  has a past surgical history that includes other surgical history; Colonoscopy (02/26/2010); and Coronary artery bypass graft (04/2011).  Social History:  reports that he quit smoking about 78 years ago. His smoking use included cigars. He has never used smokeless tobacco. He reports that he does not

## 2024-06-03 DIAGNOSIS — R35.0 BENIGN PROSTATIC HYPERPLASIA WITH URINARY FREQUENCY: ICD-10-CM

## 2024-06-03 DIAGNOSIS — I25.10 CORONARY ARTERY DISEASE INVOLVING NATIVE CORONARY ARTERY OF NATIVE HEART WITHOUT ANGINA PECTORIS: ICD-10-CM

## 2024-06-03 DIAGNOSIS — E78.49 OTHER HYPERLIPIDEMIA: ICD-10-CM

## 2024-06-03 DIAGNOSIS — N40.1 BENIGN PROSTATIC HYPERPLASIA WITH URINARY FREQUENCY: ICD-10-CM

## 2024-06-03 RX ORDER — SIMVASTATIN 40 MG
40 TABLET ORAL NIGHTLY
Qty: 90 TABLET | Refills: 1 | Status: SHIPPED | OUTPATIENT
Start: 2024-06-03

## 2024-06-03 RX ORDER — TAMSULOSIN HYDROCHLORIDE 0.4 MG/1
0.4 CAPSULE ORAL 2 TIMES DAILY
Qty: 180 CAPSULE | Refills: 1 | Status: SHIPPED | OUTPATIENT
Start: 2024-06-03

## 2024-06-03 NOTE — TELEPHONE ENCOUNTER
Last Appointment:  1/16/2024  Future Appointments   Date Time Provider Department Center   7/16/2024  1:30 PM Mathieu Malcolm MD [facility]

## 2024-07-16 ENCOUNTER — OFFICE VISIT (OUTPATIENT)
Dept: FAMILY MEDICINE CLINIC | Age: 89
End: 2024-07-16
Payer: MEDICARE

## 2024-07-16 VITALS
TEMPERATURE: 97.1 F | HEIGHT: 65 IN | HEART RATE: 67 BPM | BODY MASS INDEX: 29.52 KG/M2 | RESPIRATION RATE: 17 BRPM | WEIGHT: 177.2 LBS | SYSTOLIC BLOOD PRESSURE: 116 MMHG | DIASTOLIC BLOOD PRESSURE: 70 MMHG | OXYGEN SATURATION: 98 %

## 2024-07-16 DIAGNOSIS — R35.0 BENIGN PROSTATIC HYPERPLASIA WITH URINARY FREQUENCY: ICD-10-CM

## 2024-07-16 DIAGNOSIS — Z12.5 SCREENING FOR PROSTATE CANCER: ICD-10-CM

## 2024-07-16 DIAGNOSIS — I25.10 CORONARY ARTERY DISEASE INVOLVING NATIVE CORONARY ARTERY OF NATIVE HEART WITHOUT ANGINA PECTORIS: ICD-10-CM

## 2024-07-16 DIAGNOSIS — I10 HYPERTENSION, UNSPECIFIED TYPE: Primary | ICD-10-CM

## 2024-07-16 DIAGNOSIS — E78.49 OTHER HYPERLIPIDEMIA: ICD-10-CM

## 2024-07-16 DIAGNOSIS — Z00.00 MEDICARE ANNUAL WELLNESS VISIT, SUBSEQUENT: ICD-10-CM

## 2024-07-16 DIAGNOSIS — N40.1 BENIGN PROSTATIC HYPERPLASIA WITH URINARY FREQUENCY: ICD-10-CM

## 2024-07-16 DIAGNOSIS — I10 HYPERTENSION, UNSPECIFIED TYPE: ICD-10-CM

## 2024-07-16 LAB
ALBUMIN: 3.8 G/DL (ref 3.5–5.2)
ALP BLD-CCNC: 99 U/L (ref 40–129)
ALT SERPL-CCNC: 12 U/L (ref 0–40)
ANION GAP SERPL CALCULATED.3IONS-SCNC: 12 MMOL/L (ref 7–16)
AST SERPL-CCNC: 21 U/L (ref 0–39)
BASOPHILS ABSOLUTE: 0.06 K/UL (ref 0–0.2)
BASOPHILS RELATIVE PERCENT: 1 % (ref 0–2)
BILIRUB SERPL-MCNC: 0.3 MG/DL (ref 0–1.2)
BUN BLDV-MCNC: 19 MG/DL (ref 6–23)
CALCIUM SERPL-MCNC: 8.8 MG/DL (ref 8.6–10.2)
CHLORIDE BLD-SCNC: 106 MMOL/L (ref 98–107)
CHOLESTEROL, TOTAL: 153 MG/DL
CO2: 23 MMOL/L (ref 22–29)
CREAT SERPL-MCNC: 1.1 MG/DL (ref 0.7–1.2)
EOSINOPHILS ABSOLUTE: 0.18 K/UL (ref 0.05–0.5)
EOSINOPHILS RELATIVE PERCENT: 3 % (ref 0–6)
GFR, ESTIMATED: 64 ML/MIN/1.73M2
GLUCOSE BLD-MCNC: 100 MG/DL (ref 74–99)
HCT VFR BLD CALC: 38.3 % (ref 37–54)
HDLC SERPL-MCNC: 48 MG/DL
HEMOGLOBIN: 12.2 G/DL (ref 12.5–16.5)
IMMATURE GRANULOCYTES %: 1 % (ref 0–5)
IMMATURE GRANULOCYTES ABSOLUTE: 0.08 K/UL (ref 0–0.58)
LDL CHOLESTEROL: 76 MG/DL
LYMPHOCYTES ABSOLUTE: 1.17 K/UL (ref 1.5–4)
LYMPHOCYTES RELATIVE PERCENT: 18 % (ref 20–42)
MCH RBC QN AUTO: 29.8 PG (ref 26–35)
MCHC RBC AUTO-ENTMCNC: 31.9 G/DL (ref 32–34.5)
MCV RBC AUTO: 93.4 FL (ref 80–99.9)
MONOCYTES ABSOLUTE: 0.74 K/UL (ref 0.1–0.95)
MONOCYTES RELATIVE PERCENT: 11 % (ref 2–12)
NEUTROPHILS ABSOLUTE: 4.44 K/UL (ref 1.8–7.3)
NEUTROPHILS RELATIVE PERCENT: 67 % (ref 43–80)
PDW BLD-RTO: 14.1 % (ref 11.5–15)
PLATELET # BLD: 210 K/UL (ref 130–450)
PMV BLD AUTO: 10.5 FL (ref 7–12)
POTASSIUM SERPL-SCNC: 5 MMOL/L (ref 3.5–5)
PROSTATE SPECIFIC ANTIGEN: 2.4 NG/ML (ref 0–4)
RBC # BLD: 4.1 M/UL (ref 3.8–5.8)
SODIUM BLD-SCNC: 141 MMOL/L (ref 132–146)
TOTAL PROTEIN: 6.9 G/DL (ref 6.4–8.3)
TRIGL SERPL-MCNC: 143 MG/DL
TSH SERPL DL<=0.05 MIU/L-ACNC: 2.88 UIU/ML (ref 0.27–4.2)
VLDLC SERPL CALC-MCNC: 29 MG/DL
WBC # BLD: 6.7 K/UL (ref 4.5–11.5)

## 2024-07-16 PROCEDURE — 93000 ELECTROCARDIOGRAM COMPLETE: CPT | Performed by: FAMILY MEDICINE

## 2024-07-16 PROCEDURE — 1123F ACP DISCUSS/DSCN MKR DOCD: CPT | Performed by: FAMILY MEDICINE

## 2024-07-16 PROCEDURE — G0439 PPPS, SUBSEQ VISIT: HCPCS | Performed by: FAMILY MEDICINE

## 2024-07-16 SDOH — ECONOMIC STABILITY: INCOME INSECURITY: HOW HARD IS IT FOR YOU TO PAY FOR THE VERY BASICS LIKE FOOD, HOUSING, MEDICAL CARE, AND HEATING?: NOT HARD AT ALL

## 2024-07-16 SDOH — ECONOMIC STABILITY: FOOD INSECURITY: WITHIN THE PAST 12 MONTHS, THE FOOD YOU BOUGHT JUST DIDN'T LAST AND YOU DIDN'T HAVE MONEY TO GET MORE.: NEVER TRUE

## 2024-07-16 SDOH — ECONOMIC STABILITY: FOOD INSECURITY: WITHIN THE PAST 12 MONTHS, YOU WORRIED THAT YOUR FOOD WOULD RUN OUT BEFORE YOU GOT MONEY TO BUY MORE.: NEVER TRUE

## 2024-07-16 ASSESSMENT — PATIENT HEALTH QUESTIONNAIRE - PHQ9
SUM OF ALL RESPONSES TO PHQ QUESTIONS 1-9: 0
SUM OF ALL RESPONSES TO PHQ9 QUESTIONS 1 & 2: 0
2. FEELING DOWN, DEPRESSED OR HOPELESS: NOT AT ALL
SUM OF ALL RESPONSES TO PHQ QUESTIONS 1-9: 0
SUM OF ALL RESPONSES TO PHQ QUESTIONS 1-9: 0
1. LITTLE INTEREST OR PLEASURE IN DOING THINGS: NOT AT ALL
SUM OF ALL RESPONSES TO PHQ QUESTIONS 1-9: 0

## 2024-07-16 NOTE — PROGRESS NOTES
findings  Medicare annual wellness visit, subsequent  Completed, all smart blocks addressed  Coronary artery disease involving native coronary artery of native heart without angina pectoris  Had CABG, denies ischemic symptoms. Has not kept up with Cardiologists  Benign prostatic hyperplasia with urinary frequency  -     PSA Screening; Future    Other hyperlipidemia  -     Lipid Panel; Future  -     TSH; Future    Screening for prostate cancer  -     PSA Screening; Future          Return for Medicare Annual Wellness Visit in 1 year.    Electronically signed by Mathieu Malcolm MD on 7/16/24 at 2:18 PM EDT

## 2024-07-16 NOTE — PATIENT INSTRUCTIONS
cataracts, macular degeneration, and other eye disorders.  A preventive dental visit is recommended every 6 months.  Try to get at least 150 minutes of exercise per week or 10,000 steps per day on a pedometer .  Order or download the FREE \"Exercise & Physical Activity: Your Everyday Guide\" from [employer] [place] [employer]. Call 1-697.109.9455 or search [employer] [place] [employer] online.  You need 2168-9478 mg of calcium and 6315-1395 IU of vitamin D per day. It is possible to meet your calcium requirement with diet alone, but a vitamin D supplement is usually necessary to meet this goal.  When exposed to the sun, use a sunscreen that protects against both UVA and UVB radiation with an SPF of 30 or greater. Reapply every 2 to 3 hours or after sweating, drying off with a towel, or swimming.  Always wear a seat belt when traveling in a car. Always wear a helmet when riding a bicycle or motorcycle.

## 2024-07-18 ASSESSMENT — ENCOUNTER SYMPTOMS
COUGH: 0
SHORTNESS OF BREATH: 0
SINUS PAIN: 0
ABDOMINAL PAIN: 0
EYE REDNESS: 0
WHEEZING: 0
DIARRHEA: 0
TROUBLE SWALLOWING: 0
BACK PAIN: 0
PHOTOPHOBIA: 0
CONSTIPATION: 0
VOMITING: 0
BLOOD IN STOOL: 0
COLOR CHANGE: 0
SORE THROAT: 0

## 2024-09-19 ENCOUNTER — OFFICE VISIT (OUTPATIENT)
Dept: FAMILY MEDICINE CLINIC | Age: 89
End: 2024-09-19

## 2024-09-19 VITALS
BODY MASS INDEX: 29.66 KG/M2 | DIASTOLIC BLOOD PRESSURE: 70 MMHG | OXYGEN SATURATION: 97 % | SYSTOLIC BLOOD PRESSURE: 130 MMHG | WEIGHT: 178 LBS | HEART RATE: 62 BPM | HEIGHT: 65 IN | RESPIRATION RATE: 18 BRPM | TEMPERATURE: 97.1 F

## 2024-09-19 DIAGNOSIS — I25.10 CORONARY ARTERY DISEASE INVOLVING NATIVE CORONARY ARTERY OF NATIVE HEART WITHOUT ANGINA PECTORIS: ICD-10-CM

## 2024-09-19 DIAGNOSIS — Z01.818 PREOP TESTING: Primary | ICD-10-CM

## 2024-09-19 DIAGNOSIS — N40.1 BENIGN PROSTATIC HYPERPLASIA WITH URINARY FREQUENCY: ICD-10-CM

## 2024-09-19 DIAGNOSIS — E78.49 OTHER HYPERLIPIDEMIA: ICD-10-CM

## 2024-09-19 DIAGNOSIS — R35.0 BENIGN PROSTATIC HYPERPLASIA WITH URINARY FREQUENCY: ICD-10-CM

## 2024-09-19 RX ORDER — TAMSULOSIN HYDROCHLORIDE 0.4 MG/1
0.4 CAPSULE ORAL 2 TIMES DAILY
Qty: 180 CAPSULE | Refills: 1 | Status: SHIPPED | OUTPATIENT
Start: 2024-09-19

## 2024-09-19 RX ORDER — METOPROLOL TARTRATE 25 MG/1
25 TABLET, FILM COATED ORAL 2 TIMES DAILY
Qty: 180 TABLET | Refills: 1 | Status: SHIPPED | OUTPATIENT
Start: 2024-09-19

## 2024-09-19 RX ORDER — NITROGLYCERIN 0.4 MG/1
TABLET SUBLINGUAL
Qty: 25 TABLET | Refills: 0 | Status: SHIPPED | OUTPATIENT
Start: 2024-09-19

## 2024-09-19 RX ORDER — SIMVASTATIN 40 MG
40 TABLET ORAL NIGHTLY
Qty: 90 TABLET | Refills: 1 | Status: SHIPPED | OUTPATIENT
Start: 2024-09-19

## 2024-09-19 ASSESSMENT — ENCOUNTER SYMPTOMS
COUGH: 0
SINUS PAIN: 0
SHORTNESS OF BREATH: 0
BACK PAIN: 0
WHEEZING: 0
EYE REDNESS: 0
SORE THROAT: 0
BLOOD IN STOOL: 0
ABDOMINAL PAIN: 0
COLOR CHANGE: 0
VOMITING: 0
PHOTOPHOBIA: 0
CONSTIPATION: 0
TROUBLE SWALLOWING: 0
DIARRHEA: 0

## 2024-09-23 ENCOUNTER — TELEPHONE (OUTPATIENT)
Dept: FAMILY MEDICINE CLINIC | Age: 89
End: 2024-09-23

## 2024-10-12 DIAGNOSIS — I25.10 CORONARY ARTERY DISEASE INVOLVING NATIVE CORONARY ARTERY OF NATIVE HEART WITHOUT ANGINA PECTORIS: ICD-10-CM

## 2024-10-14 RX ORDER — NITROGLYCERIN 0.4 MG/1
TABLET SUBLINGUAL
Qty: 25 TABLET | Refills: 11 | Status: SHIPPED | OUTPATIENT
Start: 2024-10-14

## 2024-10-14 NOTE — TELEPHONE ENCOUNTER
Last Appointment:  9/19/2024  Future Appointments   Date Time Provider Department Center   1/16/2025  1:15 PM Mathieu Malcolm MD [facility]

## 2024-12-09 ENCOUNTER — OFFICE VISIT (OUTPATIENT)
Dept: FAMILY MEDICINE CLINIC | Age: 89
End: 2024-12-09
Payer: MEDICARE

## 2024-12-09 VITALS
WEIGHT: 181 LBS | RESPIRATION RATE: 17 BRPM | SYSTOLIC BLOOD PRESSURE: 160 MMHG | BODY MASS INDEX: 30.16 KG/M2 | HEART RATE: 78 BPM | HEIGHT: 65 IN | DIASTOLIC BLOOD PRESSURE: 70 MMHG | TEMPERATURE: 97.8 F | OXYGEN SATURATION: 98 %

## 2024-12-09 DIAGNOSIS — R35.0 BENIGN PROSTATIC HYPERPLASIA WITH URINARY FREQUENCY: ICD-10-CM

## 2024-12-09 DIAGNOSIS — N40.1 BENIGN PROSTATIC HYPERPLASIA WITH URINARY FREQUENCY: ICD-10-CM

## 2024-12-09 DIAGNOSIS — I10 PRIMARY HYPERTENSION: ICD-10-CM

## 2024-12-09 DIAGNOSIS — I25.119 ATHEROSCLEROSIS OF NATIVE CORONARY ARTERY OF NATIVE HEART WITH ANGINA PECTORIS (HCC): Primary | ICD-10-CM

## 2024-12-09 DIAGNOSIS — E78.49 OTHER HYPERLIPIDEMIA: ICD-10-CM

## 2024-12-09 PROCEDURE — 1159F MED LIST DOCD IN RCRD: CPT | Performed by: FAMILY MEDICINE

## 2024-12-09 PROCEDURE — 99214 OFFICE O/P EST MOD 30 MIN: CPT | Performed by: FAMILY MEDICINE

## 2024-12-09 PROCEDURE — 1123F ACP DISCUSS/DSCN MKR DOCD: CPT | Performed by: FAMILY MEDICINE

## 2024-12-09 RX ORDER — AMLODIPINE BESYLATE 5 MG/1
5 TABLET ORAL DAILY
Qty: 90 TABLET | Refills: 1 | Status: SHIPPED | OUTPATIENT
Start: 2024-12-09

## 2024-12-09 ASSESSMENT — ENCOUNTER SYMPTOMS
CONSTIPATION: 0
COLOR CHANGE: 0
ABDOMINAL PAIN: 0
SHORTNESS OF BREATH: 0
BACK PAIN: 0
SINUS PAIN: 0
SORE THROAT: 0
RHINORRHEA: 1
BLOOD IN STOOL: 0
COUGH: 0
WHEEZING: 0
VOMITING: 0
DIARRHEA: 0
TROUBLE SWALLOWING: 0
EYE REDNESS: 0
PHOTOPHOBIA: 0

## 2024-12-10 NOTE — PROGRESS NOTES
OFFICE NOTE    24  Name: Billy Martinez  :1935   Sex:male   Age:89 y.o.      SUBJECTIVE  Chief Complaint   Patient presents with    Hypertension    Eye Problem     Broken blood vessel right eye        HPI Billy has had both cataracts operated on. On both occasions BP was high. Had small retinal blood vessel bleed in right eye which is being followed. Last used eye gtts with steroid and antiinflammatory about 3-4 weeks ago he believes    Review of Systems   Constitutional:  Positive for fatigue. Negative for activity change, appetite change, fever and unexpected weight change.   HENT:  Positive for congestion and rhinorrhea. Negative for ear pain, hearing loss, sinus pain, sore throat and trouble swallowing.    Eyes:  Negative for photophobia, redness and visual disturbance.   Respiratory:  Negative for cough, shortness of breath and wheezing.    Cardiovascular:  Negative for chest pain, palpitations and leg swelling.   Gastrointestinal:  Negative for abdominal pain, blood in stool, constipation, diarrhea and vomiting.   Endocrine: Negative for cold intolerance, polydipsia and polyuria.   Genitourinary:  Positive for frequency and urgency. Negative for difficulty urinating, dysuria, genital sores and hematuria.   Musculoskeletal:  Positive for arthralgias. Negative for back pain and joint swelling.   Skin:  Negative for color change, pallor and rash.   Allergic/Immunologic: Negative for food allergies.   Neurological:  Negative for dizziness, tremors, seizures, syncope, weakness, numbness and headaches.   Hematological:  Negative for adenopathy. Does not bruise/bleed easily.   Psychiatric/Behavioral:  Negative for agitation, confusion, dysphoric mood, hallucinations and sleep disturbance. The patient is not nervous/anxious.    All other systems reviewed and are negative.           Current Outpatient Medications:     amLODIPine (NORVASC) 5 MG tablet, Take 1 tablet by mouth daily, Disp: 90 tablet, Rfl: 1

## 2025-01-16 ENCOUNTER — OFFICE VISIT (OUTPATIENT)
Dept: FAMILY MEDICINE CLINIC | Age: 89
End: 2025-01-16

## 2025-01-16 VITALS
DIASTOLIC BLOOD PRESSURE: 74 MMHG | SYSTOLIC BLOOD PRESSURE: 130 MMHG | RESPIRATION RATE: 17 BRPM | HEIGHT: 65 IN | TEMPERATURE: 97.1 F | HEART RATE: 91 BPM | OXYGEN SATURATION: 95 % | BODY MASS INDEX: 29.99 KG/M2 | WEIGHT: 180 LBS

## 2025-01-16 DIAGNOSIS — N48.6 PEYRONIE'S DISEASE: ICD-10-CM

## 2025-01-16 DIAGNOSIS — R35.0 BENIGN PROSTATIC HYPERPLASIA WITH URINARY FREQUENCY: ICD-10-CM

## 2025-01-16 DIAGNOSIS — I25.10 CORONARY ARTERY DISEASE INVOLVING NATIVE CORONARY ARTERY OF NATIVE HEART WITHOUT ANGINA PECTORIS: ICD-10-CM

## 2025-01-16 DIAGNOSIS — N40.1 BENIGN PROSTATIC HYPERPLASIA WITH URINARY FREQUENCY: ICD-10-CM

## 2025-01-16 DIAGNOSIS — I10 PRIMARY HYPERTENSION: ICD-10-CM

## 2025-01-16 DIAGNOSIS — E78.49 OTHER HYPERLIPIDEMIA: ICD-10-CM

## 2025-01-16 DIAGNOSIS — Z23 IMMUNIZATION DUE: ICD-10-CM

## 2025-01-16 DIAGNOSIS — Z00.00 MEDICARE ANNUAL WELLNESS VISIT, SUBSEQUENT: Primary | ICD-10-CM

## 2025-01-16 RX ORDER — AMLODIPINE BESYLATE 5 MG/1
5 TABLET ORAL DAILY
Qty: 90 TABLET | Refills: 1 | Status: SHIPPED | OUTPATIENT
Start: 2025-01-16

## 2025-01-16 RX ORDER — METOPROLOL TARTRATE 25 MG/1
25 TABLET, FILM COATED ORAL 2 TIMES DAILY
Qty: 180 TABLET | Refills: 1 | Status: SHIPPED | OUTPATIENT
Start: 2025-01-16

## 2025-01-16 RX ORDER — SIMVASTATIN 40 MG
40 TABLET ORAL NIGHTLY
Qty: 90 TABLET | Refills: 1 | Status: SHIPPED | OUTPATIENT
Start: 2025-01-16

## 2025-01-16 RX ORDER — TAMSULOSIN HYDROCHLORIDE 0.4 MG/1
0.4 CAPSULE ORAL 2 TIMES DAILY
Qty: 180 CAPSULE | Refills: 1 | Status: SHIPPED | OUTPATIENT
Start: 2025-01-16

## 2025-01-16 SDOH — ECONOMIC STABILITY: FOOD INSECURITY: WITHIN THE PAST 12 MONTHS, YOU WORRIED THAT YOUR FOOD WOULD RUN OUT BEFORE YOU GOT MONEY TO BUY MORE.: NEVER TRUE

## 2025-01-16 SDOH — ECONOMIC STABILITY: FOOD INSECURITY: WITHIN THE PAST 12 MONTHS, THE FOOD YOU BOUGHT JUST DIDN'T LAST AND YOU DIDN'T HAVE MONEY TO GET MORE.: NEVER TRUE

## 2025-01-16 ASSESSMENT — PATIENT HEALTH QUESTIONNAIRE - PHQ9
1. LITTLE INTEREST OR PLEASURE IN DOING THINGS: NOT AT ALL
SUM OF ALL RESPONSES TO PHQ QUESTIONS 1-9: 0
SUM OF ALL RESPONSES TO PHQ QUESTIONS 1-9: 0
SUM OF ALL RESPONSES TO PHQ9 QUESTIONS 1 & 2: 0
SUM OF ALL RESPONSES TO PHQ QUESTIONS 1-9: 0
SUM OF ALL RESPONSES TO PHQ QUESTIONS 1-9: 0
2. FEELING DOWN, DEPRESSED OR HOPELESS: NOT AT ALL

## 2025-01-16 ASSESSMENT — ENCOUNTER SYMPTOMS
COLOR CHANGE: 0
CONSTIPATION: 0
SINUS PAIN: 0
COUGH: 0
EYE REDNESS: 0
BLOOD IN STOOL: 0
BACK PAIN: 0
DIARRHEA: 0
ABDOMINAL PAIN: 0
WHEEZING: 0
RHINORRHEA: 1
SORE THROAT: 0
PHOTOPHOBIA: 0
TROUBLE SWALLOWING: 0
SHORTNESS OF BREATH: 0
VOMITING: 0

## 2025-01-16 NOTE — PROGRESS NOTES
Medicare Annual Wellness Visit    Billy Martinez is here for Medicare AWV    Assessment & Plan   Medicare annual wellness visit, subsequent  Benign prostatic hyperplasia with urinary frequency  -     tamsulosin (FLOMAX) 0.4 MG capsule; Take 1 capsule by mouth 2 times daily, Disp-180 capsule, R-1Normal  Coronary artery disease involving native coronary artery of native heart without angina pectoris  -     simvastatin (ZOCOR) 40 MG tablet; Take 1 tablet by mouth nightly, Disp-90 tablet, R-1Normal  -     metoprolol tartrate (LOPRESSOR) 25 MG tablet; Take 1 tablet by mouth 2 times daily, Disp-180 tablet, R-1Normal  -     amLODIPine (NORVASC) 5 MG tablet; Take 1 tablet by mouth daily, Disp-90 tablet, R-1Normal  Other hyperlipidemia  -     simvastatin (ZOCOR) 40 MG tablet; Take 1 tablet by mouth nightly, Disp-90 tablet, R-1Normal  -     metoprolol tartrate (LOPRESSOR) 25 MG tablet; Take 1 tablet by mouth 2 times daily, Disp-180 tablet, R-1Normal  Peyronie's disease  Primary hypertension  Immunization due  -     Influenza, FLUAD Trivalent, (age 65 y+), IM, Preservative Free, 0.5mL       Return for Medicare Annual Wellness Visit in 1 year.     Subjective   The following acute and/or chronic problems were also addressed today:  See OV note  Patient's complete Health Risk Assessment and screening values have been reviewed and are found in Flowsheets. The following problems were reviewed today and where indicated follow up appointments were made and/or referrals ordered.    Positive Risk Factor Screenings with Interventions:    Fall Risk:  Do you feel unsteady or are you worried about falling? : no  2 or more falls in past year?: (!) yes  Fall with injury in past year?: no     Interventions:    Reviewed medications, home hazards, visual acuity, and co-morbidities that can increase risk for falls. 2 falls in woods last year. No injuries. None at home                 Hearing Screen:  Do you or your family notice any trouble with

## 2025-02-28 ENCOUNTER — OFFICE VISIT (OUTPATIENT)
Dept: FAMILY MEDICINE CLINIC | Age: 89
End: 2025-02-28

## 2025-02-28 VITALS
OXYGEN SATURATION: 95 % | HEIGHT: 65 IN | TEMPERATURE: 97.5 F | WEIGHT: 183 LBS | DIASTOLIC BLOOD PRESSURE: 62 MMHG | SYSTOLIC BLOOD PRESSURE: 124 MMHG | HEART RATE: 83 BPM | BODY MASS INDEX: 30.49 KG/M2

## 2025-02-28 DIAGNOSIS — J20.8 VIRAL BRONCHITIS: Primary | ICD-10-CM

## 2025-02-28 LAB
INFLUENZA A ANTIBODY: NEGATIVE
INFLUENZA B ANTIBODY: NEGATIVE

## 2025-02-28 RX ORDER — ALBUTEROL SULFATE 90 UG/1
2 INHALANT RESPIRATORY (INHALATION)
Qty: 18 G | Refills: 0 | Status: SHIPPED | OUTPATIENT
Start: 2025-02-28

## 2025-02-28 RX ORDER — BENZONATATE 200 MG/1
200 CAPSULE ORAL 3 TIMES DAILY PRN
Qty: 15 CAPSULE | Refills: 0 | Status: SHIPPED | OUTPATIENT
Start: 2025-02-28

## 2025-02-28 NOTE — PROGRESS NOTES
Chief Complaint       Cough (X2 days with coughing and congestion/[place] OTC)      History of Present Illness   Source of history provided by: patientSharon Martinez is a 89 y.o. old male presenting to the walk in clinic for evaluation of a mild nonproductive cough, chest congestion, and sore throat x 2 days. Has been taking skye selzer and cold medicine OTC without relief. Denies any fever, chills, wheezing, CP, SOB, or GI symptoms. No any hx of asthma, COPD, or tobacco use. Denies any contact with any individuals with known COVID-19 infection or under investigation for COVID-19 infection.     ROS    Unless otherwise stated in this report or unable to obtain because of the patient's clinical or mental status as evidenced by the medical record, this patients's positive and negative responses for Review of Systems, constitutional, psych, eyes, ENT, cardiovascular, respiratory, gastrointestinal, neurological, genitourinary, musculoskeletal, integument systems and systems related to the presenting problem are either stated in the preceding or were not pertinent or were negative for the symptoms and/or complaints related to the medical problem.    Past Medical History:  has a past medical history of Amputation, finger, traumatic, Aneurysm, abdominal aortic, CAD (coronary artery disease), Cervicobrachial syndrome, Hyperlipidemia, Peyronie's disease, Radiculopathy, lumbar region, and Renal cyst, left.  Past Surgical History:  has a past surgical history that includes other surgical history; Colonoscopy (02/26/2010); and Coronary artery bypass graft (04/2011).  Social History:  reports that he quit smoking about 79 years ago. His smoking use included cigars. He has never used smokeless tobacco. He reports that he does not currently use alcohol. He reports that he does not use drugs.  Family History: family history includes Breast Cancer in his sister; Cancer in his brother; Diabetes in his mother; Lung Cancer

## 2025-03-10 ENCOUNTER — OFFICE VISIT (OUTPATIENT)
Dept: FAMILY MEDICINE CLINIC | Age: 89
End: 2025-03-10
Payer: MEDICARE

## 2025-03-10 VITALS
SYSTOLIC BLOOD PRESSURE: 128 MMHG | DIASTOLIC BLOOD PRESSURE: 74 MMHG | TEMPERATURE: 97.3 F | HEART RATE: 72 BPM | WEIGHT: 181 LBS | OXYGEN SATURATION: 94 % | HEIGHT: 65 IN | RESPIRATION RATE: 17 BRPM | BODY MASS INDEX: 30.16 KG/M2

## 2025-03-10 DIAGNOSIS — J40 BRONCHITIS WITH WHEEZING: Primary | ICD-10-CM

## 2025-03-10 PROCEDURE — 1159F MED LIST DOCD IN RCRD: CPT | Performed by: FAMILY MEDICINE

## 2025-03-10 PROCEDURE — 1123F ACP DISCUSS/DSCN MKR DOCD: CPT | Performed by: FAMILY MEDICINE

## 2025-03-10 PROCEDURE — 96372 THER/PROPH/DIAG INJ SC/IM: CPT | Performed by: FAMILY MEDICINE

## 2025-03-10 PROCEDURE — 99213 OFFICE O/P EST LOW 20 MIN: CPT | Performed by: FAMILY MEDICINE

## 2025-03-10 RX ORDER — METHYLPREDNISOLONE ACETATE 40 MG/ML
40 INJECTION, SUSPENSION INTRA-ARTICULAR; INTRALESIONAL; INTRAMUSCULAR; SOFT TISSUE ONCE
Status: COMPLETED | OUTPATIENT
Start: 2025-03-10 | End: 2025-03-10

## 2025-03-10 RX ORDER — GUAIFENESIN 600 MG/1
600 TABLET, EXTENDED RELEASE ORAL 2 TIMES DAILY
Qty: 30 TABLET | Refills: 0 | Status: SHIPPED | OUTPATIENT
Start: 2025-03-10 | End: 2025-03-25

## 2025-03-10 RX ORDER — DOXYCYCLINE HYCLATE 100 MG
100 TABLET ORAL 2 TIMES DAILY
Qty: 20 TABLET | Refills: 0 | Status: SHIPPED | OUTPATIENT
Start: 2025-03-10 | End: 2025-03-20

## 2025-03-10 RX ADMIN — METHYLPREDNISOLONE ACETATE 40 MG: 40 INJECTION, SUSPENSION INTRA-ARTICULAR; INTRALESIONAL; INTRAMUSCULAR; SOFT TISSUE at 10:41

## 2025-03-10 ASSESSMENT — ENCOUNTER SYMPTOMS
WHEEZING: 1
ABDOMINAL PAIN: 0
EYE REDNESS: 0
SHORTNESS OF BREATH: 0
TROUBLE SWALLOWING: 0
SORE THROAT: 1
PHOTOPHOBIA: 0
COUGH: 1
BLOOD IN STOOL: 0

## 2025-03-12 ENCOUNTER — TELEPHONE (OUTPATIENT)
Dept: FAMILY MEDICINE CLINIC | Age: 89
End: 2025-03-12

## 2025-07-15 ENCOUNTER — OFFICE VISIT (OUTPATIENT)
Dept: FAMILY MEDICINE CLINIC | Age: 89
End: 2025-07-15
Payer: MEDICARE

## 2025-07-15 VITALS
HEART RATE: 84 BPM | BODY MASS INDEX: 29.82 KG/M2 | HEIGHT: 65 IN | RESPIRATION RATE: 18 BRPM | WEIGHT: 179 LBS | TEMPERATURE: 98.3 F | DIASTOLIC BLOOD PRESSURE: 70 MMHG | OXYGEN SATURATION: 97 % | SYSTOLIC BLOOD PRESSURE: 120 MMHG

## 2025-07-15 DIAGNOSIS — I25.10 CORONARY ARTERY DISEASE INVOLVING NATIVE CORONARY ARTERY OF NATIVE HEART WITHOUT ANGINA PECTORIS: ICD-10-CM

## 2025-07-15 DIAGNOSIS — R35.0 BENIGN PROSTATIC HYPERPLASIA WITH URINARY FREQUENCY: ICD-10-CM

## 2025-07-15 DIAGNOSIS — E78.49 OTHER HYPERLIPIDEMIA: ICD-10-CM

## 2025-07-15 DIAGNOSIS — N40.1 BENIGN PROSTATIC HYPERPLASIA WITH URINARY FREQUENCY: ICD-10-CM

## 2025-07-15 DIAGNOSIS — L57.0 ACTINIC KERATOSES: Primary | ICD-10-CM

## 2025-07-15 LAB
ALBUMIN: 4.1 G/DL (ref 3.5–5.2)
ALP BLD-CCNC: 104 U/L (ref 40–129)
ALT SERPL-CCNC: 17 U/L (ref 0–50)
ANION GAP SERPL CALCULATED.3IONS-SCNC: 13 MMOL/L (ref 7–16)
AST SERPL-CCNC: 27 U/L (ref 0–50)
BASOPHILS ABSOLUTE: 0.04 K/UL (ref 0–0.2)
BASOPHILS RELATIVE PERCENT: 1 % (ref 0–2)
BILIRUB SERPL-MCNC: 0.4 MG/DL (ref 0–1.2)
BILIRUBIN, URINE: NEGATIVE
BUN BLDV-MCNC: 15 MG/DL (ref 8–23)
CALCIUM SERPL-MCNC: 9.5 MG/DL (ref 8.8–10.2)
CHLORIDE BLD-SCNC: 104 MMOL/L (ref 98–107)
CHOLESTEROL, TOTAL: 153 MG/DL
CO2: 24 MMOL/L (ref 22–29)
COLOR, UA: YELLOW
CREAT SERPL-MCNC: 1 MG/DL (ref 0.7–1.2)
EOSINOPHILS ABSOLUTE: 0.19 K/UL (ref 0.05–0.5)
EOSINOPHILS RELATIVE PERCENT: 3 % (ref 0–6)
EPI CELLS #/AREA URNS HPF: NORMAL /HPF
EPITHELIAL CELLS, UA: NORMAL /HPF
GFR, ESTIMATED: 75 ML/MIN/1.73M2
GLUCOSE BLD-MCNC: 88 MG/DL (ref 74–99)
GLUCOSE URINE: NEGATIVE MG/DL
HCT VFR BLD CALC: 37.3 % (ref 37–54)
HDLC SERPL-MCNC: 43 MG/DL
HEMOGLOBIN: 11.7 G/DL (ref 12.5–16.5)
IMMATURE GRANULOCYTES %: 0 % (ref 0–5)
IMMATURE GRANULOCYTES ABSOLUTE: <0.03 K/UL (ref 0–0.58)
KETONES, URINE: NEGATIVE MG/DL
LDL CHOLESTEROL: 70 MG/DL
LEUKOCYTE ESTERASE, URINE: NEGATIVE
LYMPHOCYTES ABSOLUTE: 1.01 K/UL (ref 1.5–4)
LYMPHOCYTES RELATIVE PERCENT: 17 % (ref 20–42)
MCH RBC QN AUTO: 28.3 PG (ref 26–35)
MCHC RBC AUTO-ENTMCNC: 31.4 G/DL (ref 32–34.5)
MCV RBC AUTO: 90.1 FL (ref 80–99.9)
MONOCYTES ABSOLUTE: 0.72 K/UL (ref 0.1–0.95)
MONOCYTES RELATIVE PERCENT: 12 % (ref 2–12)
NEUTROPHILS ABSOLUTE: 4.02 K/UL (ref 1.8–7.3)
NEUTROPHILS RELATIVE PERCENT: 67 % (ref 43–80)
NITRITE, URINE: NEGATIVE
PDW BLD-RTO: 14.6 % (ref 11.5–15)
PH, URINE: 6 (ref 5–8)
PLATELET # BLD: 218 K/UL (ref 130–450)
PMV BLD AUTO: 10.6 FL (ref 7–12)
POTASSIUM SERPL-SCNC: 4.6 MMOL/L (ref 3.5–5.1)
PROSTATE SPECIFIC ANTIGEN: 0.53 NG/ML (ref 0–4)
PROTEIN UA: NEGATIVE MG/DL
PSA SERPL-MCNC: 0.53 NG/ML (ref 0–4)
RBC # BLD: 4.14 M/UL (ref 3.8–5.8)
RBC #/AREA URNS HPF: NORMAL /HPF
RBC UA: NORMAL /HPF
SODIUM BLD-SCNC: 140 MMOL/L (ref 136–145)
SPECIFIC GRAVITY UA: 1.02 (ref 1–1.03)
TOTAL PROTEIN: 7.2 G/DL (ref 6.4–8.3)
TRIGL SERPL-MCNC: 199 MG/DL
TSH SERPL DL<=0.05 MIU/L-ACNC: 4.07 UIU/ML (ref 0.27–4.2)
TURBIDITY: CLEAR
URINE HGB: ABNORMAL
UROBILINOGEN, URINE: 0.2 EU/DL (ref 0–1)
VLDLC SERPL CALC-MCNC: 40 MG/DL
WBC # BLD: 6 K/UL (ref 4.5–11.5)
WBC #/AREA URNS HPF: NORMAL /HPF
WBC UA: NORMAL /HPF

## 2025-07-15 PROCEDURE — 99214 OFFICE O/P EST MOD 30 MIN: CPT | Performed by: FAMILY MEDICINE

## 2025-07-15 PROCEDURE — 1159F MED LIST DOCD IN RCRD: CPT | Performed by: FAMILY MEDICINE

## 2025-07-15 PROCEDURE — 17000 DESTRUCT PREMALG LESION: CPT | Performed by: FAMILY MEDICINE

## 2025-07-15 PROCEDURE — 1123F ACP DISCUSS/DSCN MKR DOCD: CPT | Performed by: FAMILY MEDICINE

## 2025-07-15 RX ORDER — AMLODIPINE BESYLATE 5 MG/1
5 TABLET ORAL DAILY
Qty: 90 TABLET | Refills: 1 | Status: SHIPPED | OUTPATIENT
Start: 2025-07-15

## 2025-07-15 RX ORDER — SIMVASTATIN 40 MG
40 TABLET ORAL NIGHTLY
Qty: 90 TABLET | Refills: 1 | Status: SHIPPED | OUTPATIENT
Start: 2025-07-15

## 2025-07-15 RX ORDER — METOPROLOL TARTRATE 25 MG/1
25 TABLET, FILM COATED ORAL 2 TIMES DAILY
Qty: 180 TABLET | Refills: 1 | Status: SHIPPED | OUTPATIENT
Start: 2025-07-15

## 2025-07-15 RX ORDER — TAMSULOSIN HYDROCHLORIDE 0.4 MG/1
0.4 CAPSULE ORAL 2 TIMES DAILY
Qty: 180 CAPSULE | Refills: 1 | Status: SHIPPED | OUTPATIENT
Start: 2025-07-15

## 2025-07-15 ASSESSMENT — ENCOUNTER SYMPTOMS
BACK PAIN: 0
SORE THROAT: 0
WHEEZING: 0
COLOR CHANGE: 0
COUGH: 0
CONSTIPATION: 0
VOMITING: 0
BLOOD IN STOOL: 0
EYE REDNESS: 0
TROUBLE SWALLOWING: 0
SINUS PAIN: 0
PHOTOPHOBIA: 0
ABDOMINAL PAIN: 0
DIARRHEA: 0
SHORTNESS OF BREATH: 0

## 2025-07-15 NOTE — PROGRESS NOTES
OFFICE NOTE    7/15/25  Name: Billy Martinez  :1935   Sex:male   Age:89 y.o.      SUBJECTIVE  Chief Complaint   Patient presents with    Hyperlipidemia       History of Present Illness  The patient presents for evaluation of actinic keratosis, hypertension, and prostate issues.    He reports a significant improvement in his condition, with the last two weeks of 2025 being particularly challenging. During this period, he experienced severe back pain, which he initially attributed to a muscle strain. However, the pain was so intense that even light touch caused discomfort. He also mentions a slight decrease in his work pace but notes that he is still able to complete his tasks.    He has noticed a small patch on his ear, which he believes might be due to frostbite. He has been applying lotion to it regularly.    He is currently on medication for high blood pressure and is wondering if he should continue taking it.    He received a letter from Dr. Thomas regarding a blood test. His prostate has been deemed benign, with no cancer cells detected.       Review of Systems   Constitutional:  Positive for activity change and fatigue. Negative for appetite change, fever and unexpected weight change.   HENT:  Positive for hearing loss. Negative for congestion, ear pain, sinus pain, sore throat and trouble swallowing.    Eyes:  Negative for photophobia, redness and visual disturbance.   Respiratory:  Negative for cough, shortness of breath and wheezing.    Cardiovascular:  Negative for chest pain, palpitations and leg swelling.   Gastrointestinal:  Negative for abdominal pain, blood in stool, constipation, diarrhea and vomiting.   Endocrine: Negative for cold intolerance, polydipsia and polyuria.   Genitourinary:  Positive for frequency and urgency. Negative for difficulty urinating, dysuria, genital sores and hematuria.   Musculoskeletal:  Positive for arthralgias. Negative for back pain and joint swelling.   Skin: